# Patient Record
Sex: MALE | Race: WHITE | NOT HISPANIC OR LATINO | Employment: FULL TIME | URBAN - METROPOLITAN AREA
[De-identification: names, ages, dates, MRNs, and addresses within clinical notes are randomized per-mention and may not be internally consistent; named-entity substitution may affect disease eponyms.]

---

## 2023-12-21 ENCOUNTER — OFFICE VISIT (OUTPATIENT)
Dept: ENDOCRINOLOGY | Age: 43
End: 2023-12-21
Payer: COMMERCIAL

## 2023-12-21 ENCOUNTER — SPECIALTY PHARMACY (OUTPATIENT)
Dept: ENDOCRINOLOGY | Age: 43
End: 2023-12-21
Payer: COMMERCIAL

## 2023-12-21 VITALS
DIASTOLIC BLOOD PRESSURE: 80 MMHG | SYSTOLIC BLOOD PRESSURE: 132 MMHG | TEMPERATURE: 97.2 F | HEIGHT: 72 IN | WEIGHT: 260.4 LBS | OXYGEN SATURATION: 99 % | BODY MASS INDEX: 35.27 KG/M2 | HEART RATE: 71 BPM

## 2023-12-21 DIAGNOSIS — E11.9 TYPE 2 DIABETES MELLITUS WITHOUT COMPLICATION, WITHOUT LONG-TERM CURRENT USE OF INSULIN: Primary | ICD-10-CM

## 2023-12-21 DIAGNOSIS — E78.2 MIXED HYPERLIPIDEMIA: ICD-10-CM

## 2023-12-21 DIAGNOSIS — E55.9 VITAMIN D INSUFFICIENCY: ICD-10-CM

## 2023-12-21 DIAGNOSIS — I10 ESSENTIAL HYPERTENSION: ICD-10-CM

## 2023-12-21 RX ORDER — METFORMIN HYDROCHLORIDE 500 MG/1
1000 TABLET, EXTENDED RELEASE ORAL DAILY
Qty: 60 TABLET | Refills: 3 | Status: SHIPPED | OUTPATIENT
Start: 2023-12-21 | End: 2024-03-20

## 2023-12-21 RX ORDER — SEMAGLUTIDE 0.68 MG/ML
0.25 INJECTION, SOLUTION SUBCUTANEOUS WEEKLY
Qty: 3 ML | Refills: 3 | Status: SHIPPED | OUTPATIENT
Start: 2023-12-21

## 2023-12-21 RX ORDER — ALLOPURINOL 300 MG/1
TABLET ORAL
COMMUNITY
Start: 2022-04-01

## 2023-12-21 RX ORDER — ROSUVASTATIN CALCIUM 10 MG/1
10 TABLET, COATED ORAL NIGHTLY
Qty: 30 TABLET | Refills: 11 | Status: SHIPPED | OUTPATIENT
Start: 2023-12-21 | End: 2024-12-20

## 2023-12-21 RX ORDER — LISINOPRIL 2.5 MG/1
2.5 TABLET ORAL DAILY
Qty: 30 TABLET | Refills: 11 | Status: SHIPPED | OUTPATIENT
Start: 2023-12-21 | End: 2024-12-20

## 2023-12-21 NOTE — ASSESSMENT & PLAN NOTE
Given family history of premature coronary artery disease and type 2 diabetes will start rosuvastatin 10 mg daily  Emphasized on the importance of diet and daily exercise  Repeat fasting lipid profile today

## 2023-12-21 NOTE — PROGRESS NOTES
"Chief Complaint  Diabetes (Dm 2/ not testing bs )    Subjective        Siena Pop Jr. presents to [facility] to establish care        Diabetes  Pertinent negatives for hypoglycemia include no dizziness. Pertinent negatives for diabetes include no fatigue.       Initially was diagnosed with prediabetes     HgA1C: 6.6% in 2023 , diabetic range now  Takes metformin 1000 mg twice daily  Denies diabetic complications  checks his fasting blood sugar usually it is in 120-160      His son has type 2 diabetes and his father also has type 2 diabetes  His dad had a heart attack at young age  His mother  of CHF before age 60    Denies personal or family history of pancreatitis or pancreatic cancer  Denies personal or family history of thyroid cancer      TSH 2.256  Free T4 : 1.09  TPO negative     Has vitamin D deficiency and takes vitamin D supplements    Vitamin D 28.8       Fasting lipid profile in 2023       Does not take any medication        Used to take losartan which was switched to lisinopril 2.5 mg daily  Has been off lisinopril since 2023 (ran out of medication)         Objective   Vital Signs:  /80   Pulse 71   Temp 97.2 °F (36.2 °C) (Temporal)   Ht 182.9 cm (72\")   Wt 118 kg (260 lb 6.4 oz)   SpO2 99%   BMI 35.32 kg/m²   Estimated body mass index is 35.32 kg/m² as calculated from the following:    Height as of this encounter: 182.9 cm (72\").    Weight as of this encounter: 118 kg (260 lb 6.4 oz).             Review of Systems   Constitutional:  Negative for fatigue and unexpected weight change.   Eyes:  Negative for visual disturbance.   Gastrointestinal:  Negative for abdominal pain, nausea and vomiting.   Neurological:  Negative for dizziness and light-headedness.        Physical Exam  Constitutional:       Appearance: He is obese.   HENT:      Head: Normocephalic and atraumatic.   Eyes:      Extraocular Movements: Extraocular " movements intact.   Cardiovascular:      Rate and Rhythm: Normal rate and regular rhythm.   Pulmonary:      Effort: Pulmonary effort is normal.      Breath sounds: Normal breath sounds. No wheezing.   Abdominal:      Palpations: Abdomen is soft.      Tenderness: There is no abdominal tenderness.   Musculoskeletal:         General: No swelling. Normal range of motion.      Cervical back: Neck supple. No tenderness.   Neurological:      Mental Status: He is alert and oriented to person, place, and time.   Psychiatric:         Mood and Affect: Mood normal.        Result Review :      Data reviewed : Outside labs reviewed and is scanned             Assessment and Plan   Diagnoses and all orders for this visit:    1. Type 2 diabetes mellitus without complication, without long-term current use of insulin (Primary)  Assessment & Plan:  Diabetes is unchanged.   Reminded to bring in blood sugar diary at next visit.  Dietary recommendations for ADA diet.  Regular aerobic exercise.  Discussed ways to avoid symptomatic hypoglycemia.  Discussed foot care.  Reminded to get yearly retinal exam.  Medication changes per orders.  Prescription for metformin extended release 500 mg 2 tablets daily was sent in  Will start Ozempic  GLP-1 agonists are given by shots under the skin. They are not insulin.   They make your pancreas produce more insulin when needed in response to food and high blood sugar levels, if the pancreas can make more insulin.     They come in pens to be injected under the skin (subcutaneously). One of them is a daily pill (Rebylsus) that has to be taken in the morning on an empty stomach 30 minutes before eating.     These medications include: Trulicity once weekly; Victoza once daily, Byetta twice daily, Bydureon once weekly, or Ozempic once weekly.     They also suppress your appetite and make a person goss faster.     Benefits could include: 1. Weight reduction; 2. Better blood sugar levels; 3. Victoza, Ozempic  and Trulicity have added benefits for cardiovascular disease.     Main side effects could include: nausea and vomiting.   Possible associations with these medications that haven't definitively been found to be caused by these medications: medullary thyroid cancer, pancreatitis, and pancreatic cancer.   Ozempic is started at 0.25 mg weekly for 4 weeks, then increased to 0.5 mg weekly. Dose can be further increased to if needed.  Check hemoglobin A1c, C-peptide, microalbumin, and alonzo 65    Diabetes will be reassessed in 3 months.    Orders:  -     Hemoglobin A1c  -     C-Peptide  -     Comprehensive Metabolic Panel  -     Microalbumin / Creatinine Urine Ratio - Urine, Clean Catch  -     Vitamin D,25-Hydroxy  -     Glutamic Acid Decarboxylase  -     metFORMIN ER (GLUCOPHAGE-XR) 500 MG 24 hr tablet; Take 2 tablets by mouth Daily for 90 days.  Dispense: 60 tablet; Refill: 3  -     Semaglutide,0.25 or 0.5MG/DOS, (Ozempic, 0.25 or 0.5 MG/DOSE,) 2 MG/3ML solution pen-injector; Inject 0.25 mg under the skin into the appropriate area as directed 1 (One) Time Per Week for 4 weeks. Then increase the dose to 0.5 mg weekly.  Dispense: 3 mL; Refill: 3  -     Ambulatory Referral to Diabetic Education  -     rosuvastatin (Crestor) 10 MG tablet; Take 1 tablet by mouth Every Night.  Dispense: 30 tablet; Refill: 11    2. Mixed hyperlipidemia  Assessment & Plan:  Given family history of premature coronary artery disease and type 2 diabetes will start rosuvastatin 10 mg daily  Emphasized on the importance of diet and daily exercise  Repeat fasting lipid profile today    Orders:  -     Lipid Panel  -     rosuvastatin (Crestor) 10 MG tablet; Take 1 tablet by mouth Every Night.  Dispense: 30 tablet; Refill: 11    3. Vitamin D insufficiency  Assessment & Plan:  Takes vitamin D we will check vitamin D level and adjust the dose of vitamin D accordingly    Orders:  -     Vitamin D,25-Hydroxy    4. Essential hypertension  Assessment &  Plan:  Will resume the home dose of lisinopril 2.5 mg daily emphasized on the importance of diet and medication compliance      Orders:  -     lisinopril (Zestril) 2.5 MG tablet; Take 1 tablet by mouth Daily.  Dispense: 30 tablet; Refill: 11             Follow Up   Return in about 3 months (around 3/21/2024).  Patient was given instructions and counseling regarding his condition or for health maintenance advice. Please see specific information pulled into the AVS if appropriate.

## 2023-12-21 NOTE — PROGRESS NOTES
Specialty Pharmacy Patient Management Program  Endocrinology Initial Assessment     Siena Pop Jr. was referred by an Endocrinology provider to the Endocrinology Patient Management program offered by [facility] for Type 2 Diabetes on 12/21/23.  An initial outreach was conducted, including assessment of therapy appropriateness and specialty medication education for Ozempic. The patient was introduced to services offered by [facility], including: regular assessments, refill coordination, curbside pick-up or mail order delivery options, prior authorization maintenance, and financial assistance programs as applicable. The patient was also provided with contact information for the pharmacy team.     Insurance Coverage & Financial Support  Benefits Investigation Summary    Prescription: New Therapy    Dispensing pharmacy: [facility]    Copay amount: $25/30days *with co-pay card*    PLAN: [employer]  BIN: [id number]  PCN: [facility]  RX GROUP: [id number]    Prior Auth and Med Assistance notes: PA approved 12/21/23 through 12/20/24  CMM Key: [id number]    Relevant Past Medical History and Comorbidities  Relevant medical history and concomitant health conditions were discussed with the patient. The patient's chart has been reviewed for relevant past medical history and comorbid conditions and updated as necessary.  No past medical history on file.  Social History     Socioeconomic History    Marital status:    Tobacco Use    Smoking status: Never    Smokeless tobacco: Never   Substance and Sexual Activity    Alcohol use: Yes     Comment: 1-2 drinks week       Problem list reviewed by Lulu Madison, PharmD on 12/21/2023 at  1:47 PM    Allergies  Known allergies and reactions were discussed with the patient. The patient's chart has been reviewed for  allergy information and updated as necessary.   Allergies   Allergen Reactions    Losartan Other  "(See Comments)     Severe back pain        Allergies reviewed by Lulu Madison PharmD on 12/21/2023 at  1:47 PM    Relevant Laboratory Values  Relevant laboratory values were discussed with the patient. The following specialty medication dose adjustment(s) are recommended: No dosage adjustments recommended pending new lab results.     No results found for: \"HGBA1C\"  No results found for: \"GLUCOSE\", \"CALCIUM\", \"NA\", \"K\", \"CO2\", \"CL\", \"BUN\", \"CREATININE\", \"EGFRIFAFRI\", \"EGFRIFNONA\", \"BCR\", \"ANIONGAP\"  No results found for: \"CHOL\", \"CHLPL\", \"TRIG\", \"HDL\", \"LDL\", \"LDLDIRECT\"      Current Medication List  This medication list has been reviewed with the patient and evaluated for any interactions or necessary modifications/recommendations, and updated to include all prescription medications, OTC medications, and supplements the patient is currently taking.  This list reflects what is contained in the patient's profile, which has also been marked as reviewed to communicate to other providers it is the most up to date version of the patient's current medication therapy.     Current Outpatient Medications:     allopurinol (ZYLOPRIM) 300 MG tablet, , Disp: , Rfl:     Cholecalciferol (VITAMIN D-3 PO), Take  by mouth., Disp: , Rfl:     lisinopril (Zestril) 2.5 MG tablet, Take 1 tablet by mouth Daily., Disp: 30 tablet, Rfl: 11    metFORMIN ER (GLUCOPHAGE-XR) 500 MG 24 hr tablet, Take 2 tablets by mouth Daily for 90 days., Disp: 60 tablet, Rfl: 3    Multiple Vitamins-Minerals (Multi For Him) capsule, Take  by mouth., Disp: , Rfl:     rosuvastatin (Crestor) 10 MG tablet, Take 1 tablet by mouth Every Night., Disp: 30 tablet, Rfl: 11    Semaglutide,0.25 or 0.5MG/DOS, (Ozempic, 0.25 or 0.5 MG/DOSE,) 2 MG/3ML solution pen-injector, Inject 0.25 mg under the skin into the appropriate area as directed 1 (One) Time Per Week for 4 weeks. Then increase the dose to 0.5 mg weekly., Disp: 3 mL, Rfl: 3         Drug Interactions  No drug " interactions identified  Recommended Medications Assessment  Aspirin: Not Taking Currently  Statin: Currently Taking   ACEi/ARB: Currently Taking     Initial Education Provided for Specialty Medication  The patient has been provided with the following education and any applicable administration techniques (i.e. self-injection) have been demonstrated for the therapies indicated. All questions and concerns have been addressed prior to the patient receiving the medication, and the patient has verbalized comprehension of the education and any materials provided. Additional patient education shall be provided and documented upon request by the patient, provider, or payer.    Ozempic® (semaglutide)  Medication Expectations   Why am I taking this medication? You are taking Ozempic, along with diet and exercise, to lower blood sugar because you have type 2 diabetes. Diabetes is not curable but with proper medication and treatment, we can keep your blood sugar within your personalized target range.    What should I expect while on this medication? You should expect to see your blood sugar, A1c and possibly weight decrease over time.   How does the medication work? Ozempic is a non-insulin injection that works with your body to release insulin in response to your blood sugar rising.  This medication also slows down food from leaving your stomach, making you feel goss for longer.   How long will I be on this medication for? The amount of time you will be on this medication will be determined by your doctor based on blood sugar and A1c control. You will most likely be on this medication or another diabetes medication throughout your lifetime. Do not abruptly stop this medication without talking to your doctor first.    How do I take this medication? Take as directed on your prescription label. Ozempic is injected under the skin (subcutaneously) of your stomach, thigh or upper arm.  Use this medication once weekly, on the same  day each week, and it can be given with or without food.    What are some possible side effects? You may notice you don't feel as hungry, especially when you first start using Ozempic.  The most common side effects are nausea, stomach cramping, and constipation. Stop using Ozempic and call your doctor immediately if you have severe pain in your stomach area that will not go away.    What happens if I miss a dose? If you miss a dose, take it as soon as you remember as long as it is within 5 days after your missed dose.  If more than 5 days have passed, skip the missed dose and resume Ozempic on the regularly scheduled day.     Medication Safety   What are things I should warn my doctor immediately about? Do not use Ozempic if you or a family member have ever had medullary thyroid cancer (MTC) or Multiple Endocrine Neoplasia syndrome type 2 (MEN 2).  Tell your doctor if you have or have had problems with your kidneys or pancreas, or if you are pregnant, planning to become pregnant. Stop using Ozempic and get medical help right away if you have severe pain that will not go away, or if you notice any signs/symptoms of an allergic reaction (rash, hives, difficulty breathing, etc.).    What are things that I should be cautious of? Be cautious of any side effects from this medication. Talk to your doctor if any new ones develop or aren't getting better.    What are some medications that can interact with this one? Taking Ozempic with other medications that also lower your blood sugar such as insulin and glipizide/glimepiride/glyburide may increase the risk of low blood sugar. Your doctor may reduce the dose of these medications when you start Ozempic.  Always tell your doctor or pharmacist immediately if you start taking any new medications, including over-the-counter medications, vitamins, and herbal supplements.       Medication Storage/Handling   How should I handle this medication? Keep this medication out of reach of  pets/children and keep the pen capped    How does this medication need to be stored? Store in the refrigerator prior to first use, but do not freeze.  After first use, you may continue to store in the refrigerator or at room temperature for 56 days.  Protect from excessive heat and sunlight.   How should I dispose of this medication? Used Ozempic pens should be thrown away after 56 days, even if medication remains. If your doctor decides to stop this medication, take to your local police station for proper disposal. Some pharmacies also have take-back bins for medication drop-off.      Resources/Support   How can I remind myself to take this medication? You can download reminder apps to help you manage your refills. You may also set an alarm on your phone to remind you.    Is financial support available?  [employer] can provide co-pay cards if you have commercial insurance or patient assistance if you have Medicare or no insurance.    Which vaccines are recommended for me? Talk to your doctor about these vaccines: Flu, Coronavirus (COVID-19), Pneumococcal (pneumonia), Tdap, Hepatitis B, Zoster (shingles)      Adherence and Self-Administration  Adherence related to the patient's specialty therapy was discussed with the patient. The Adherence segment of this outreach has been reviewed and updated.     Is there a concern with patient's ability to self administer the medication correctly and without issue?: No  Were any potential barriers to adherence identified during the initial assessment or patient education?: No  Are there any concerns regarding the patient's understanding of the importance of medication adherence?: No  Methods for Supporting Patient Adherence and/or Self-Administration: N/A    Open Medication Therapy Problems  No medication therapy recommendations to display    Goals of Therapy  Goals related to the patient's specialty therapy were discussed with the patient. The Patient Goals segment of this  "outreach has been reviewed and updated.   Goals Addressed Today        Specialty Pharmacy General Goal      A1c < 7%    No results found for: \"HGBA1C\"              Reassessment Plan & Follow-Up  1. Medication Therapy Changes: Initiate Ozempic 0.25mg once weekly for 4 weeks followed by 0.5mg once weekly. Switch to metformin XR 1000mg daily. Resuming lisinopril 2.5mg once daily previously prescribed and initiating rosuvastatin 10mg daily.   2. Related Plans, Therapy Recommendations, or Therapy Problems to Be Addressed: None  3. Pharmacist to perform regular assessments no more than (6) months from the previous assessment.  4. Care Coordinator to set up future refill outreaches, coordinate prescription delivery, and escalate clinical questions to pharmacist.  5. Welcome information and patient satisfaction survey to be sent by specialty pharmacy team with patient's initial fill.    Attestation  Therapeutic appropriateness: Appropriate   I attest the patient was actively involved in and has agreed to the above plan of care. If the prescribed therapy is at any point deemed not appropriate based on the current or future assessments, a consultation will be initiated with the patient's specialty care provider to determine the best course of action. The revised plan of therapy will be documented along with any required assessments and/or additional patient education provided.     Lulu Madison, PharmD, BCPS, BCCCP  Clinical Specialty Pharmacist, Endocrinology  12/21/2023  13:51 EST  "

## 2023-12-21 NOTE — ASSESSMENT & PLAN NOTE
Will resume the home dose of lisinopril 2.5 mg daily emphasized on the importance of diet and medication compliance

## 2023-12-21 NOTE — ASSESSMENT & PLAN NOTE
Diabetes is unchanged.   Reminded to bring in blood sugar diary at next visit.  Dietary recommendations for ADA diet.  Regular aerobic exercise.  Discussed ways to avoid symptomatic hypoglycemia.  Discussed foot care.  Reminded to get yearly retinal exam.  Medication changes per orders.  Prescription for metformin extended release 500 mg 2 tablets daily was sent in  Will start Ozempic  GLP-1 agonists are given by shots under the skin. They are not insulin.   They make your pancreas produce more insulin when needed in response to food and high blood sugar levels, if the pancreas can make more insulin.     They come in pens to be injected under the skin (subcutaneously). One of them is a daily pill (Rebylsus) that has to be taken in the morning on an empty stomach 30 minutes before eating.     These medications include: Trulicity once weekly; Victoza once daily, Byetta twice daily, Bydureon once weekly, or Ozempic once weekly.     They also suppress your appetite and make a person goss faster.     Benefits could include: 1. Weight reduction; 2. Better blood sugar levels; 3. Victoza, Ozempic and Trulicity have added benefits for cardiovascular disease.     Main side effects could include: nausea and vomiting.   Possible associations with these medications that haven't definitively been found to be caused by these medications: medullary thyroid cancer, pancreatitis, and pancreatic cancer.   Ozempic is started at 0.25 mg weekly for 4 weeks, then increased to 0.5 mg weekly. Dose can be further increased to if needed.  Check hemoglobin A1c, C-peptide, microalbumin, and alonzo 65    Diabetes will be reassessed in 3 months.

## 2023-12-22 ENCOUNTER — PATIENT ROUNDING (BHMG ONLY) (OUTPATIENT)
Dept: ENDOCRINOLOGY | Age: 43
End: 2023-12-22
Payer: COMMERCIAL

## 2023-12-26 LAB
25(OH)D3+25(OH)D2 SERPL-MCNC: 22.8 NG/ML (ref 30–100)
ALBUMIN SERPL-MCNC: 4.5 G/DL (ref 4.1–5.1)
ALBUMIN/CREAT UR: <5 MG/G CREAT (ref 0–29)
ALBUMIN/GLOB SERPL: 1.7 {RATIO} (ref 1.2–2.2)
ALP SERPL-CCNC: 75 IU/L (ref 44–121)
ALT SERPL-CCNC: 48 IU/L (ref 0–44)
AST SERPL-CCNC: 30 IU/L (ref 0–40)
BILIRUB SERPL-MCNC: 0.5 MG/DL (ref 0–1.2)
BUN SERPL-MCNC: 18 MG/DL (ref 6–24)
BUN/CREAT SERPL: 17 (ref 9–20)
C PEPTIDE SERPL-MCNC: 3.1 NG/ML (ref 1.1–4.4)
CALCIUM SERPL-MCNC: 9.6 MG/DL (ref 8.7–10.2)
CHLORIDE SERPL-SCNC: 101 MMOL/L (ref 96–106)
CHOLEST SERPL-MCNC: 247 MG/DL (ref 100–199)
CO2 SERPL-SCNC: 23 MMOL/L (ref 20–29)
CREAT SERPL-MCNC: 1.03 MG/DL (ref 0.76–1.27)
CREAT UR-MCNC: 54.7 MG/DL
EGFRCR SERPLBLD CKD-EPI 2021: 92 ML/MIN/1.73
GAD65 AB SER IA-ACNC: <5 U/ML (ref 0–5)
GLOBULIN SER CALC-MCNC: 2.7 G/DL (ref 1.5–4.5)
GLUCOSE SERPL-MCNC: 151 MG/DL (ref 70–99)
HBA1C MFR BLD: 7.1 % (ref 4.8–5.6)
HDLC SERPL-MCNC: 32 MG/DL
IMP & REVIEW OF LAB RESULTS: NORMAL
LDLC SERPL CALC-MCNC: 135 MG/DL (ref 0–99)
MICROALBUMIN UR-MCNC: <3 UG/ML
POTASSIUM SERPL-SCNC: 4.4 MMOL/L (ref 3.5–5.2)
PROT SERPL-MCNC: 7.2 G/DL (ref 6–8.5)
SODIUM SERPL-SCNC: 138 MMOL/L (ref 134–144)
TRIGL SERPL-MCNC: 439 MG/DL (ref 0–149)
VLDLC SERPL CALC-MCNC: 80 MG/DL (ref 5–40)

## 2024-01-18 ENCOUNTER — SPECIALTY PHARMACY (OUTPATIENT)
Dept: ENDOCRINOLOGY | Age: 44
End: 2024-01-18
Payer: COMMERCIAL

## 2024-01-18 NOTE — PROGRESS NOTES
Specialty Pharmacy Refill Coordination Note     Siena is a 43 y.o. male contacted today regarding refills of  1 specialty medication(s).    Reviewed and verified with patient:       Specialty medication(s) and dose(s) confirmed: yes    Refill Questions      Flowsheet Row Most Recent Value   Changes to allergies? No   Changes to medications? No   New conditions or infections since last clinic visit No   Unplanned office visit, urgent care, ED, or hospital admission in the last 4 weeks  No   How does patient/caregiver feel medication is working? Good   Financial problems or insurance changes  No   Since the previous refill, were any specialty medication doses or scheduled injections missed or delayed?  No   Does this patient require a clinical escalation to a pharmacist? No            Delivery Questions      Flowsheet Row Most Recent Value   Delivery method  at Pharmacy   Delivery address verified with patient/caregiver? Yes   Delivery address Home   Number of medications in delivery 3   Medication(s) being filled and delivered Metformin Hcl (Biguanides), Lisinopril (Ace Inhibitors), Rosuvastatin Calcium   Doses left of specialty medications 1 week   Copay verified? Yes   Copay amount $19.07   Copay form of payment Pay at pickup                   Follow-up: 25 day(s)     Maria L Dan, Pharmacy Technician  Specialty Pharmacy Technician

## 2024-01-26 ENCOUNTER — SPECIALTY PHARMACY (OUTPATIENT)
Dept: ENDOCRINOLOGY | Age: 44
End: 2024-01-26
Payer: COMMERCIAL

## 2024-01-26 NOTE — PROGRESS NOTES
Specialty Pharmacy Refill Coordination Note     Siena is a 43 y.o. male contacted today regarding refills of  1 specialty medication(s).    Reviewed and verified with patient:       Specialty medication(s) and dose(s) confirmed: yes    Refill Questions      Flowsheet Row Most Recent Value   Changes to allergies? No   Changes to medications? No   New conditions or infections since last clinic visit No   Unplanned office visit, urgent care, ED, or hospital admission in the last 4 weeks  No   How does patient/caregiver feel medication is working? Good   Financial problems or insurance changes  No   Since the previous refill, were any specialty medication doses or scheduled injections missed or delayed?  No   Does this patient require a clinical escalation to a pharmacist? No            Delivery Questions      Flowsheet Row Most Recent Value   Delivery method  at Pharmacy   Delivery address verified with patient/caregiver? Yes   Delivery address Home   Number of medications in delivery 1   Medication(s) being filled and delivered Semaglutide   Doses left of specialty medications 1 week   Copay verified? Yes   Copay amount $25   Copay form of payment Pay at pickup                   Follow-up: 25 day(s)     Maria L Dan, Pharmacy Technician  Specialty Pharmacy Technician

## 2024-02-05 ENCOUNTER — SPECIALTY PHARMACY (OUTPATIENT)
Dept: ENDOCRINOLOGY | Age: 44
End: 2024-02-05
Payer: COMMERCIAL

## 2024-02-05 NOTE — PROGRESS NOTES
Specialty Pharmacy Patient Management Program  One-Time Clinical Outreach     Siena Pop Jr. is a 43 y.o. male seen by an Endocrinology provider for Type 2 Diabetes and enrolled in the Endocrinology Patient Management program offered by [facility].      Patient called and explained that he had experienced some nausea and headache after his first dose of ozempic 0.5 mg. Provided counseling to patient about how to minimize upset stomach symptoms including decreasing portion sizes of meals slightly, avoiding greasy/fatty foods, etc. Also explained to patient that when increasing the dose, it is common to experience some of these symptoms for the first 1-2 doses, then symptoms typically start to resolve. Patient is going to continue the medication and was advised to call if symptoms do not improve or worsen.  Patient expressed understanding and has no other questions at this time.    Clari Monique, PharmD  Clinical Specialty Pharmacist, Endocrinology  2/5/2024  15:12 EST

## 2024-02-12 ENCOUNTER — SPECIALTY PHARMACY (OUTPATIENT)
Dept: ENDOCRINOLOGY | Age: 44
End: 2024-02-12
Payer: COMMERCIAL

## 2024-02-29 ENCOUNTER — SPECIALTY PHARMACY (OUTPATIENT)
Dept: ENDOCRINOLOGY | Age: 44
End: 2024-02-29
Payer: COMMERCIAL

## 2024-02-29 NOTE — PROGRESS NOTES
Specialty Pharmacy Refill Coordination Note     Siena is a 43 y.o. male contacted today regarding refills of  2 specialty medication(s).    Reviewed and verified with patient:       Specialty medication(s) and dose(s) confirmed: yes    Refill Questions      Flowsheet Row Most Recent Value   Changes to allergies? No   Changes to medications? No   New conditions or infections since last clinic visit No   Unplanned office visit, urgent care, ED, or hospital admission in the last 4 weeks  No   How does patient/caregiver feel medication is working? Good   Financial problems or insurance changes  No   Since the previous refill, were any specialty medication doses or scheduled injections missed or delayed?  No   Does this patient require a clinical escalation to a pharmacist? No            Delivery Questions      Flowsheet Row Most Recent Value   Delivery method  at Pharmacy   Delivery address verified with patient/caregiver? Yes   Delivery address Home   Number of medications in delivery 2   Medication(s) being filled and delivered Allopurinol (Gout Agents), Semaglutide   Doses left of specialty medications 1 week   Copay verified? Yes   Copay amount 53.68   Copay form of payment Pay at pickup                   Follow-up: 25 day(s)     Maria L Dan, Pharmacy Technician  Specialty Pharmacy Technician

## 2024-03-18 ENCOUNTER — SPECIALTY PHARMACY (OUTPATIENT)
Dept: ENDOCRINOLOGY | Age: 44
End: 2024-03-18
Payer: COMMERCIAL

## 2024-03-18 NOTE — PROGRESS NOTES
Specialty Pharmacy Refill Coordination Note     Siena is a 44 y.o. male contacted today regarding refills of  3 specialty medication(s).    Reviewed and verified with patient:       Specialty medication(s) and dose(s) confirmed: yes    Refill Questions      Flowsheet Row Most Recent Value   Changes to allergies? No   Changes to medications? No   New conditions or infections since last clinic visit No   Unplanned office visit, urgent care, ED, or hospital admission in the last 4 weeks  No   How does patient/caregiver feel medication is working? Good   Financial problems or insurance changes  No   Since the previous refill, were any specialty medication doses or scheduled injections missed or delayed?  No   Does this patient require a clinical escalation to a pharmacist? No            Delivery Questions      Flowsheet Row Most Recent Value   Delivery method  at Pharmacy   Delivery address verified with patient/caregiver? Yes   Delivery address Home   Number of medications in delivery 3   Medication(s) being filled and delivered Metformin Hcl (Biguanides), Lisinopril (Ace Inhibitors), Rosuvastatin Calcium   Doses left of specialty medications 1 week   Copay verified? Yes   Copay amount $18.30   Copay form of payment Pay at pickup                   Follow-up: 25 day(s)     Maria L Dan, Pharmacy Technician  Specialty Pharmacy Technician

## 2024-03-21 ENCOUNTER — OFFICE VISIT (OUTPATIENT)
Dept: ENDOCRINOLOGY | Age: 44
End: 2024-03-21
Payer: COMMERCIAL

## 2024-03-21 VITALS
HEART RATE: 84 BPM | DIASTOLIC BLOOD PRESSURE: 82 MMHG | SYSTOLIC BLOOD PRESSURE: 126 MMHG | TEMPERATURE: 97.7 F | HEIGHT: 72 IN | BODY MASS INDEX: 32.59 KG/M2 | OXYGEN SATURATION: 98 % | WEIGHT: 240.6 LBS

## 2024-03-21 DIAGNOSIS — E78.2 MIXED HYPERLIPIDEMIA: ICD-10-CM

## 2024-03-21 DIAGNOSIS — I10 ESSENTIAL HYPERTENSION: ICD-10-CM

## 2024-03-21 DIAGNOSIS — E55.9 VITAMIN D INSUFFICIENCY: ICD-10-CM

## 2024-03-21 DIAGNOSIS — E11.65 TYPE 2 DIABETES MELLITUS WITH HYPERGLYCEMIA, WITHOUT LONG-TERM CURRENT USE OF INSULIN: Primary | ICD-10-CM

## 2024-03-21 NOTE — ASSESSMENT & PLAN NOTE
Plan:  Continue same medication/s without change.      Discussed medication dosage, use, side effects, and goals of treatment in detail.    Counseled patient on lifestyle modifications to help control hyperlipidemia.     Patient Treatment Goals:   LDL goal is less than 70  Repeat fasting lipid  Followup in 6 months.

## 2024-03-21 NOTE — PROGRESS NOTES
"Chief Complaint  Diabetes, Hyperlipidemia, Vitamin D Deficiency, and Hypertension    Subjective        Siena Pop Jr. presents to [facility]  for follow up.       History of Present Illness    Initially was diagnosed with prediabetes in     CARYN 65 negative   C-peptide 3.1     HgA1C: 7.1% in 2023    Takes metformin 500 mg two tablets daily  Takes Ozempic 0.5 mg weekly, on injection day feels nauseous   Has not feeling well after he had a fatty meal since last week   Denies diabetic complications     MACR < 5     His son has type 2 diabetes and his father also has type 2 diabetes  His dad had a heart attack at young age  His mother  of CHF before age 60     Denies personal or family history of pancreatitis or pancreatic cancer  Denies personal or family history of thyroid cancer        Has vitamin D deficiency      Vitamin D 22.8    Takes vitamin D 2000 units daily         2023  Triglyceride 439    Takes Crestor 10 mg at nightly     Takes lisinopril for blood pressure            Objective   Vital Signs:  /82   Pulse 84   Temp 97.7 °F (36.5 °C) (Temporal)   Ht 182.9 cm (72.01\")   Wt 109 kg (240 lb 9.6 oz)   SpO2 98%   BMI 32.62 kg/m²   Estimated body mass index is 32.62 kg/m² as calculated from the following:    Height as of this encounter: 182.9 cm (72.01\").    Weight as of this encounter: 109 kg (240 lb 9.6 oz).               Review of Systems   Constitutional:  Negative for unexpected weight change.   Eyes:  Negative for visual disturbance.   Gastrointestinal:  Positive for nausea.   Neurological:  Negative for dizziness and light-headedness.        Physical Exam  Constitutional:       Appearance: He is obese.   HENT:      Head: Normocephalic and atraumatic.   Eyes:      Extraocular Movements: Extraocular movements intact.   Cardiovascular:      Rate and Rhythm: Normal rate and regular rhythm.   Pulmonary:      Effort: Pulmonary effort is " normal.      Breath sounds: Normal breath sounds. No wheezing.   Abdominal:      Palpations: Abdomen is soft.      Tenderness: There is no abdominal tenderness.   Musculoskeletal:         General: No swelling. Normal range of motion.      Cervical back: Neck supple. No tenderness.   Neurological:      General: No focal deficit present.      Mental Status: He is alert and oriented to person, place, and time.   Psychiatric:         Mood and Affect: Mood normal.        Result Review :  The following data was reviewed by: Mahrokh Nokhbehzaeim, MD on 03/21/2024:  CMP          12/21/2023    10:58   CMP   Glucose 151    BUN 18    Creatinine 1.03    Sodium 138    Potassium 4.4    Chloride 101    Calcium 9.6    Total Protein 7.2    Albumin 4.5    Globulin 2.7    Total Bilirubin 0.5    Alkaline Phosphatase 75    AST (SGOT) 30    ALT (SGPT) 48    BUN/Creatinine Ratio 17          Lipid Panel          12/21/2023    10:58   Lipid Panel   Total Cholesterol 247    Triglycerides 439    HDL Cholesterol 32    VLDL Cholesterol 80    LDL Cholesterol  135        Electrolytes          12/21/2023    10:58   Electrolytes   Sodium 138    Potassium 4.4    Chloride 101    Calcium 9.6      Most Recent A1C          12/21/2023    10:58   HGBA1C Most Recent   Hemoglobin A1C 7.1        A1C Last 3 Results          12/21/2023    10:58   HGBA1C Last 3 Results   Hemoglobin A1C 7.1      Microalbumin          12/21/2023    10:58   Microalbumin   Microalbumin, Urine <3.0                   Assessment and Plan   Diagnoses and all orders for this visit:    1. Type 2 diabetes mellitus with hyperglycemia, without long-term current use of insulin (Primary)  Assessment & Plan:  Diabetes is improving with treatment.   Continue current treatment regimen.  Reminded to bring in blood sugar diary at next visit.  Recommended an ADA diet.  Recommended a Mediterranean style of eating  Regular aerobic exercise.  Discussed ways to avoid symptomatic  hypoglycemia.  Discussed sick day management.  Discussed foot care.  Reminded to get yearly retinal exam.  Continue the current dose of metformin  Due to his recent GI symptoms we will continue the current dose of Ozempic  Hemoglobin A1c, CMP and microalbumin creatinine ratio  Diabetes will be reassessed in 6 months    Orders:  -     Vitamin D,25-Hydroxy  -     Microalbumin / Creatinine Urine Ratio - Urine, Clean Catch  -     Hemoglobin A1c  -     Comprehensive Metabolic Panel    2. Vitamin D insufficiency  Assessment & Plan:  Takes vitamin D 2000 units daily repeat vitamin D level today    Orders:  -     Vitamin D,25-Hydroxy    3. Mixed hyperlipidemia  Assessment & Plan:      Plan:  Continue same medication/s without change.      Discussed medication dosage, use, side effects, and goals of treatment in detail.    Counseled patient on lifestyle modifications to help control hyperlipidemia.     Patient Treatment Goals:   LDL goal is less than 70  Repeat fasting lipid  Followup in 6 months.    Orders:  -     Lipid Panel    4. Essential hypertension  Assessment & Plan:  Hypertension is stable and controlled  Continue current treatment regimen.  Dietary sodium restriction.  Weight loss.  Regular aerobic exercise.  Blood pressure will be reassessed in 6 months.               Follow Up   Return in about 6 months (around 9/21/2024).  Patient was given instructions and counseling regarding his condition or for health maintenance advice. Please see specific information pulled into the AVS if appropriate.

## 2024-03-21 NOTE — ASSESSMENT & PLAN NOTE
Diabetes is improving with treatment.   Continue current treatment regimen.  Reminded to bring in blood sugar diary at next visit.  Recommended an ADA diet.  Recommended a Mediterranean style of eating  Regular aerobic exercise.  Discussed ways to avoid symptomatic hypoglycemia.  Discussed sick day management.  Discussed foot care.  Reminded to get yearly retinal exam.  Continue the current dose of metformin  Due to his recent GI symptoms we will continue the current dose of Ozempic  Hemoglobin A1c, CMP and microalbumin creatinine ratio  Diabetes will be reassessed in 6 months

## 2024-03-26 NOTE — TELEPHONE ENCOUNTER
Specialty Pharmacy Patient Management Program  Prescription Refill Request     Siena Pop Jr. is a 44 y.o. male seen by an Endocrinology provider for Type 2 Diabetes and enrolled in the Endocrinology Patient Management program offered by [facility].      Patient called and reported that he has continued to experience significant nausea with the ozempic 0.5 mg weekly despite making adjustments to his diet. Discussed with provider and will switch to trulicity 0.75 mg weekly for 4 weeks, then increase to 1.5 mg weekly if able to tolerate.       Requested Prescriptions     Pending Prescriptions Disp Refills    Dulaglutide (Trulicity) 0.75 MG/0.5ML solution pen-injector 2 mL 0     Sig: Inject 0.75 mg under the skin into the appropriate area as directed 1 (One) Time Per Week. For 4 weeks, then increase to 1.5 mg weekly if able to tolerate    Dulaglutide (Trulicity) 1.5 MG/0.5ML solution pen-injector 2 mL 3     Sig: Inject 1.5 mg under the skin into the appropriate area as directed 1 (One) Time Per Week. If able to tolerate 0.75 mg weekly for 4 weeks     Last visit: 3/21/24  Next visit: 9/26/24    Pended for Dr. Nokhbehzaeim to review, and approve if appropriate.     Clari Monique, PharmD  Clinical Specialty Pharmacist, Endocrinology  3/26/2024  15:41 EDT        Complex Repair And Double Advancement Flap Text: The defect edges were debeveled with a #15 scalpel blade.  The primary defect was closed partially with a complex linear closure.  Given the location of the remaining defect, shape of the defect and the proximity to free margins a double advancement flap was deemed most appropriate for complete closure of the defect.  Using a sterile surgical marker, an appropriate advancement flap was drawn incorporating the defect and placing the expected incisions within the relaxed skin tension lines where possible.    The area thus outlined was incised deep to adipose tissue with a #15 scalpel blade.  The skin margins were undermined to an appropriate distance in all directions utilizing iris scissors.

## 2024-03-27 RX ORDER — DULAGLUTIDE 0.75 MG/.5ML
0.75 INJECTION, SOLUTION SUBCUTANEOUS WEEKLY
Qty: 2 ML | Refills: 0 | Status: SHIPPED | OUTPATIENT
Start: 2024-03-27

## 2024-03-27 RX ORDER — DULAGLUTIDE 1.5 MG/.5ML
1.5 INJECTION, SOLUTION SUBCUTANEOUS WEEKLY
Qty: 2 ML | Refills: 3 | Status: SHIPPED | OUTPATIENT
Start: 2024-03-27

## 2024-03-28 ENCOUNTER — SPECIALTY PHARMACY (OUTPATIENT)
Dept: ENDOCRINOLOGY | Age: 44
End: 2024-03-28
Payer: COMMERCIAL

## 2024-03-28 NOTE — PROGRESS NOTES
Specialty Pharmacy Patient Management Program  Endocrinology Medication Addition Assessment     Siena Pop Jr. was referred by an Endocrinology provider to the Endocrinology Patient Management Program offered by [facility] for Type 2 Diabetes. This assessment was conducted as a result of a specialty medication addition or substitution. The patient was previously introduced to services offered by [facility], including: regular assessments, refill coordination, curbside pick-up or mail order delivery options, prior authorization maintenance, and financial assistance programs as applicable. The patient was also provided with contact information for the pharmacy team.      An initial outreach was conducted, including assessment of therapy appropriateness and specialty medication education for Trulicity, which is replacing ozempic.      Insurance Coverage & Financial Support  No changes  Trulicity savings card- $25 monthly copay     Relevant Past Medical History and Comorbidities  Relevant medical history and concomitant health conditions were discussed with the patient. The patient's chart has been reviewed for relevant past medical history and comorbid conditions and updated as necessary.  No past medical history on file.  Social History     Socioeconomic History    Marital status:    Tobacco Use    Smoking status: Never    Smokeless tobacco: Never   Substance and Sexual Activity    Alcohol use: Yes     Comment: 1-2 drinks week       Problem list reviewed by Clari Monique, PharmD on 3/28/2024 at  2:21 PM    Hospitalizations and Urgent Care Since Last Assessment  ED Visits, Admissions, or Hospitalizations: none  Urgent Office Visits: none    Allergies  Known allergies and reactions were discussed with the patient. The patient's chart has been reviewed for  allergy information and updated as necessary.   Allergies   Allergen Reactions    Losartan Other (See  Comments)     Severe back pain        Allergies reviewed by Clari Monique, PharmD on 3/28/2024 at  2:21 PM    Relevant Laboratory Values  Relevant laboratory values were discussed with the patient. The following specialty medication dose adjustment(s) are recommended: n/a  A1C Last 3 Results          12/21/2023    10:58   HGBA1C Last 3 Results   Hemoglobin A1C 7.1      Lab Results   Component Value Date    HGBA1C 7.1 (H) 12/21/2023     Lab Results   Component Value Date    GLUCOSE 151 (H) 12/21/2023    CALCIUM 9.6 12/21/2023     12/21/2023    K 4.4 12/21/2023    CO2 23 12/21/2023     12/21/2023    BUN 18 12/21/2023    CREATININE 1.03 12/21/2023    BCR 17 12/21/2023     Lab Results   Component Value Date    CHLPL 247 (H) 12/21/2023    TRIG 439 (H) 12/21/2023    HDL 32 (L) 12/21/2023     (H) 12/21/2023     Microalbumin          12/21/2023    10:58   Microalbumin   Microalbumin, Urine <3.0        Current Medication List  This medication list has been reviewed with the patient and evaluated for any interactions or necessary modifications/recommendations, and updated to include all prescription medications, OTC medications, and supplements the patient is currently taking.  This list reflects what is contained in the patient's profile, which has also been marked as reviewed to communicate to other providers it is the most up to date version of the patient's current medication therapy.     Current Outpatient Medications:     allopurinol (ZYLOPRIM) 300 MG tablet, Take 1 tablet by mouth Daily., Disp: 90 tablet, Rfl: 0    Cholecalciferol (VITAMIN D-3 PO), Take  by mouth., Disp: , Rfl:     Dulaglutide (Trulicity) 0.75 MG/0.5ML solution pen-injector, Inject 0.75 mg under the skin into the appropriate area as directed 1 (One) Time Per Week. For 4 weeks, then increase to 1.5 mg weekly if able to tolerate, Disp: 2 mL, Rfl: 0    Dulaglutide (Trulicity) 1.5 MG/0.5ML solution pen-injector, Inject 1.5 mg under the  skin into the appropriate area as directed 1 (One) Time Per Week. If able to tolerate 0.75 mg weekly for 4 weeks, Disp: 2 mL, Rfl: 3    lisinopril (Zestril) 2.5 MG tablet, Take 1 tablet by mouth Daily., Disp: 30 tablet, Rfl: 11    metFORMIN (GLUCOPHAGE) 1000 MG tablet, Take 1 tablet by mouth 2 (Two) Times a Day With Meals., Disp: 180 tablet, Rfl: 0    metFORMIN ER (GLUCOPHAGE-XR) 500 MG 24 hr tablet, Take 2 tablets by mouth Daily for 90 days., Disp: 60 tablet, Rfl: 3    Multiple Vitamins-Minerals (Multi For Him) capsule, Take  by mouth., Disp: , Rfl:     rosuvastatin (Crestor) 10 MG tablet, Take 1 tablet by mouth Every Night., Disp: 30 tablet, Rfl: 11    Medicines reviewed by Clari Monique, PharmD on 3/28/2024 at  2:21 PM    Drug Interactions  none    Recommended Medications Assessment  Aspirin: Not Taking Currently  Statin: Currently Taking   ACEi/ARB: Currently Taking     Initial Education Provided for Specialty Medication  The patient has been provided with the following education and any applicable administration techniques (i.e. self-injection) have been demonstrated for the therapies indicated. All questions and concerns have been addressed prior to the patient receiving the medication, and the patient has verbalized comprehension of the education and any materials provided. Additional patient education shall be provided and documented upon request by the patient, provider, or payer.    TRULICITY® (dulaglutide)  Medication Expectations   Why am I taking this medication? You are taking Trulicity, along with diet and exercise, to lower blood sugar because you have type 2 diabetes. Diabetes is not curable but with proper medication and treatment, you can keep your blood sugar within your personalized target range. This medication may also reduce the risk of heart attack or stroke   What should I expect while on this medication? You should expect to see your blood sugar and A1c decrease over time and you may also  lose some weight.   How does the medication work? Trulicity is a non-insulin injection that works with your body's own ability to lower blood sugar and A1c and helps your body release its own insulin in response to your blood sugar rising.  This medication also slows down food from leaving your stomach, making you feel goss for longer.   How long will I be on this medication for? The amount of time you will be on this medication will be determined by your doctor based on blood sugar and A1c control. You will most likely be on this medication or another diabetes medication throughout your lifetime. Do not abruptly stop this medication without talking to your doctor first.    How do I take this medication? Take as directed on your prescription label. Trulicity is supplied in a single-use pen for each dose and you will use a new pre-filled pen each week.  It is injected under the skin (subcutaneously) of your stomach, thigh or upper arm. Use this medication once weekly, on the same day each week, with or without food.      What are some possible side effects? In addition to decreased appetite, the most common side effects are nausea and constipation.  Redness, itching, and/or swelling can occur where the shot was given. Hypoglycemia can occur, especially if you are taking Trulicity with other medications that cause low blood sugar.    What happens if I miss a dose? If you miss a dose, take it as soon as you remember as long as there are at least 3 days until the next scheduled dose.  If there are less than 3 days, skip the missed dose and resume Trulicity on the regularly scheduled day.  Do not take 2 doses at the same time or extra doses.     Medication Safety   What are things I should warn my doctor immediately about? Do not use Trulicity if you or a family member have ever had medullary thyroid cancer (MTC) or Multiple Endocrine Neoplasia syndrome type 2 (MEN 2).  Tell your doctor if you have or have had problems  with your kidneys or pancreas. Stop using Trulicity and get medical help right away if you have severe pain in your stomach area that will not go away. Talk to your doctor if you are pregnant, planning to become pregnant, or breastfeeding. Get medical help right away if you notice any signs/symptoms of an allergic reaction (rash, hives, difficulty breathing, etc.).   What are things that I should be cautious of? Be cautious of any side effects from this medication. Talk to your doctor if any new ones develop or aren't getting better.   What are some medications that can interact with this one? Taking Trulicity with other medications that also lower your blood sugar such as insulin and glipizide/glimepiride/glyburide may increase the risk of low blood sugar. Your doctor may reduce the dose of these medications when you start Trulicity. Always tell your doctor or pharmacist immediately if you start taking any new medications, including over-the-counter medications, vitamins, and herbal supplements.      Medication Storage/Handling   How should I handle this medication? Keep this medication out of reach of pets/children and keep the pen capped   How does this medication need to be stored? Store Trulicity in the refrigerator. Do not freeze or use Trulicity that has been frozen.  You may store your Trulicity pens at room temperature for up to 14 days.     How should I dispose of this medication? Used Trulicity pens should discarded after each use (for single use only). If your doctor decides to stop this medication, take to your local police station for proper disposal. Some pharmacies also have take-back bins for medication drop-off.     Resources/Support   How can I remind myself to take this medication? You can download reminder apps to help you manage your refills, or set an alarm on your phone to remind you.    Is financial support available?  [facility] can provide co-pay cards if you have commercial insurance or  patient assistance if you have Medicare or no insurance.    Which vaccines are recommended for me? Talk to your doctor about these vaccines: Flu, Coronavirus (COVID-19), Pneumococcal (pneumonia), Tdap, Hepatitis B, Zoster (shingles)         Adherence, Self-Administration, and Current Therapy Problems  Adherence related to the patient's specialty therapy was discussed with the patient. The Adherence segment of this outreach has been reviewed and updated.     Is there a concern with patient's ability to self administer the medication correctly and without issue?: No  Were any potential barriers to adherence identified during the initial assessment or patient education?: No  Are there any concerns regarding the patient's understanding of the importance of medication adherence?: No    Adherence Questions  Linked Medication(s) Assessed: Semaglutide  On average, how many doses/injections does the patient miss per month?: 0  What are the identified reasons for non-adherence or missed doses? : no problems identified  What is the estimated medication adherence level?: %  Based on the patient/caregiver response and refill history, does this patient require an MTP to track adherence improvements?: no    Additional Barriers to Patient Self-Administration: lela  Methods for Supporting Patient Self-Administration: n/a    Open Medication Therapy Problems  No medication therapy recommendations to display    Adverse Drug Reactions  Medication tolerability: Tolerating with no to minimal ADRs  Medication plan: Continue therapy with normal follow-up  Plan for ADR Management: n/a    Goals of Therapy  Goals related to the patient's specialty therapy were discussed with the patient. The Patient Goals segment of this outreach has been reviewed and updated.   Goals Addressed Today        Specialty Pharmacy General Goal      A1c < 7%    Lab Results   Component Value Date    HGBA1C 7.1 (H) 12/21/2023                   Quality of Life  Assessment   Quality of Life related to the patient's enrollment in the patient management program and services provided was discussed with the patient. The QOL segment of this outreach has been reviewed and updated.    Quality of Life Improvement Scale: 6-A little better    Reassessment Plan & Follow-Up  1. Medication Therapy Changes: Start trulicity 0.75 mg weekly for 4 weeks, then increase to 1.5 mg weekly if able to tolerate. Stop ozempic.  2. Related Plans, Therapy Recommendations, or Therapy Problems to Be Addressed: none  3. Pharmacist to perform regular assessments no more than (6) months from the previous assessment.  4. Care Coordinator to set up future refill outreaches, coordinate prescription delivery, and escalate clinical questions to pharmacist.    Attestation  Therapeutic appropriateness: Appropriate   I attest the patient was actively involved in and has agreed to the above plan of care. If the prescribed therapy is at any point deemed not appropriate based on the current or future assessments, a consultation will be initiated with the patient's specialty care provider to determine the best course of action. The revised plan of therapy will be documented along with any required assessments and/or additional patient education provided.     Clari Monique, PharmD  Clinical Specialty Pharmacist, Endocrinology  3/28/2024  14:22 EDT

## 2024-04-11 ENCOUNTER — SPECIALTY PHARMACY (OUTPATIENT)
Dept: ENDOCRINOLOGY | Age: 44
End: 2024-04-11
Payer: COMMERCIAL

## 2024-04-11 DIAGNOSIS — E11.9 TYPE 2 DIABETES MELLITUS WITHOUT COMPLICATION, WITHOUT LONG-TERM CURRENT USE OF INSULIN: ICD-10-CM

## 2024-04-11 RX ORDER — METFORMIN HYDROCHLORIDE 500 MG/1
1000 TABLET, EXTENDED RELEASE ORAL DAILY
Qty: 60 TABLET | Refills: 3 | Status: SHIPPED | OUTPATIENT
Start: 2024-04-11 | End: 2024-07-10

## 2024-04-11 NOTE — TELEPHONE ENCOUNTER
Rx Refill Note  Requested Prescriptions     Pending Prescriptions Disp Refills    metFORMIN ER (GLUCOPHAGE-XR) 500 MG 24 hr tablet 60 tablet 3     Sig: Take 2 tablets by mouth Daily for 90 days.      Last office visit with prescribing clinician: 3/21/2024   Last telemedicine visit with prescribing clinician: Visit date not found   Next office visit with prescribing clinician: 9/26/2024                         Would you like a call back once the refill request has been completed: [] Yes [] No    If the office needs to give you a call back, can they leave a voicemail: [] Yes [] No    Liz Tapia  04/11/24, 09:05 EDT

## 2024-04-24 ENCOUNTER — SPECIALTY PHARMACY (OUTPATIENT)
Dept: ENDOCRINOLOGY | Age: 44
End: 2024-04-24
Payer: COMMERCIAL

## 2024-04-24 NOTE — PROGRESS NOTES
Specialty Pharmacy Refill Coordination Note     Siena is a 44 y.o. male contacted today regarding refills of  1 specialty medication(s).    Reviewed and verified with patient:       Specialty medication(s) and dose(s) confirmed: yes    Refill Questions      Flowsheet Row Most Recent Value   Changes to allergies? No   Changes to medications? No   New conditions or infections since last clinic visit No   Unplanned office visit, urgent care, ED, or hospital admission in the last 4 weeks  No   How does patient/caregiver feel medication is working? Good   Financial problems or insurance changes  No   Since the previous refill, were any specialty medication doses or scheduled injections missed or delayed?  No   Does this patient require a clinical escalation to a pharmacist? No            Delivery Questions      Flowsheet Row Most Recent Value   Delivery method  at Pharmacy   Delivery address verified with patient/caregiver? Yes   Delivery address Home   Number of medications in delivery 1   Medication(s) being filled and delivered Dulaglutide   Doses left of specialty medications 1   Copay verified? Yes   Copay amount 25   Copay form of payment Pay at pickup                   Follow-up: 25 day(s)     Maria L Dan, Pharmacy Technician  Specialty Pharmacy Technician

## 2024-05-17 ENCOUNTER — SPECIALTY PHARMACY (OUTPATIENT)
Dept: ENDOCRINOLOGY | Age: 44
End: 2024-05-17
Payer: COMMERCIAL

## 2024-05-17 NOTE — PROGRESS NOTES
Specialty Pharmacy Refill Coordination Note     Siena is a 44 y.o. male contacted today regarding refills of  4 specialty medication(s).    Reviewed and verified with patient:       Specialty medication(s) and dose(s) confirmed: yes    Refill Questions      Flowsheet Row Most Recent Value   Changes to allergies? No   Changes to medications? No   New conditions or infections since last clinic visit No   Unplanned office visit, urgent care, ED, or hospital admission in the last 4 weeks  No   How does patient/caregiver feel medication is working? Good   Financial problems or insurance changes  No   Since the previous refill, were any specialty medication doses or scheduled injections missed or delayed?  No   Does this patient require a clinical escalation to a pharmacist? No                       Follow-up: 25 day(s)     Maria L Dan, Pharmacy Technician  Specialty Pharmacy Technician

## 2024-06-10 ENCOUNTER — SPECIALTY PHARMACY (OUTPATIENT)
Dept: ENDOCRINOLOGY | Age: 44
End: 2024-06-10
Payer: COMMERCIAL

## 2024-06-10 ENCOUNTER — TELEPHONE (OUTPATIENT)
Dept: ENDOCRINOLOGY | Age: 44
End: 2024-06-10
Payer: COMMERCIAL

## 2024-06-10 RX ORDER — ALLOPURINOL 300 MG/1
300 TABLET ORAL DAILY
Qty: 90 TABLET | Refills: 0 | Status: CANCELLED | OUTPATIENT
Start: 2024-06-10

## 2024-06-10 RX ORDER — ALLOPURINOL 300 MG/1
300 TABLET ORAL DAILY
Qty: 30 TABLET | Refills: 2 | Status: SHIPPED | OUTPATIENT
Start: 2024-06-10 | End: 2024-09-08

## 2024-06-10 NOTE — PROGRESS NOTES
Specialty Pharmacy Refill Coordination Note     Siena is a 44 y.o. male contacted today regarding refills of  5 specialty medication(s).    Reviewed and verified with patient:       Specialty medication(s) and dose(s) confirmed: yes    Refill Questions      Flowsheet Row Most Recent Value   Changes to allergies? No   Changes to medications? No   New conditions or infections since last clinic visit No   Unplanned office visit, urgent care, ED, or hospital admission in the last 4 weeks  No   How does patient/caregiver feel medication is working? Good   Financial problems or insurance changes  No   Since the previous refill, were any specialty medication doses or scheduled injections missed or delayed?  No   Does this patient require a clinical escalation to a pharmacist? No            Delivery Questions      Flowsheet Row Most Recent Value   Delivery method  at Pharmacy   Delivery address verified with patient/caregiver? Yes   Delivery address Home   Number of medications in delivery 5   Medication(s) being filled and delivered Lisinopril (Ace Inhibitors), Rosuvastatin Calcium, Allopurinol (Gout Agents), Dulaglutide, Metformin Hcl (Biguanides)   Doses left of specialty medications 1 week   Copay verified? Yes   Copay amount 50.83   Copay form of payment Pay at pickup                   Follow-up: 25 day(s)     Maria L Dan, Pharmacy Technician  Specialty Pharmacy Technician         No

## 2024-06-11 ENCOUNTER — SPECIALTY PHARMACY (OUTPATIENT)
Dept: ENDOCRINOLOGY | Age: 44
End: 2024-06-11
Payer: COMMERCIAL

## 2024-06-11 NOTE — PROGRESS NOTES
Specialty Pharmacy Patient Management Program  Endocrinology Reassessment     Siena Pop Jr. was referred by an Endocrinology provider to the Endocrinology Patient Management program offered by [facility] for Type 2 Diabetes. A follow-up outreach was conducted, including assessment of continued therapy appropriateness, medication adherence, and side effect incidence and management for Trulicity.    Changes to Insurance Coverage or Financial Support  [employer] and Copay card     Relevant Past Medical History and Comorbidities  Relevant medical history and concomitant health conditions were discussed with the patient. The patient's chart has been reviewed for relevant past medical history and comorbid health conditions and updated as necessary.   No past medical history on file.  Social History     Socioeconomic History    Marital status:    Tobacco Use    Smoking status: Never    Smokeless tobacco: Never   Substance and Sexual Activity    Alcohol use: Yes     Comment: 1-2 drinks week     Problem list reviewed by Geno Cormier RPH on 6/11/2024 at  2:37 PM    Hospitalizations and Urgent Care Since Last Assessment  ED Visits, Admissions, or Hospitalizations: None reported or documented   Urgent Office Visits: None reported or documented     Allergies  Known allergies and reactions were discussed with the patient. The patient's chart has been reviewed for allergy information and updated as necessary.   Allergies   Allergen Reactions    Losartan Other (See Comments)     Severe back pain      Allergies reviewed by Geno Cormier RPH on 6/11/2024 at  2:36 PM    Relevant Laboratory Values  Relevant laboratory values were discussed with the patient. The following specialty medication dose adjustment(s) are recommended: No adjustments needed at this time.   A1C Last 3 Results          12/21/2023    10:58   HGBA1C Last 3 Results   Hemoglobin A1C 7.1      Lab Results   Component Value Date     HGBA1C 7.1 (H) 12/21/2023     Lab Results   Component Value Date    GLUCOSE 151 (H) 12/21/2023    CALCIUM 9.6 12/21/2023     12/21/2023    K 4.4 12/21/2023    CO2 23 12/21/2023     12/21/2023    BUN 18 12/21/2023    CREATININE 1.03 12/21/2023    BCR 17 12/21/2023     Lab Results   Component Value Date    CHLPL 247 (H) 12/21/2023    TRIG 439 (H) 12/21/2023    HDL 32 (L) 12/21/2023     (H) 12/21/2023     Microalbumin          12/21/2023    10:58   Microalbumin   Microalbumin, Urine <3.0      Current Medication List  This medication list has been reviewed with the patient and evaluated for any interactions or necessary modifications/recommendations, and updated to include all prescription medications, OTC medications, and supplements the patient is currently taking.  This list reflects what is contained in the patient's profile, which has also been marked as reviewed to communicate to other providers it is the most up to date version of the patient's current medication therapy.     Current Outpatient Medications:     allopurinol (ZYLOPRIM) 300 MG tablet, Take 1 tablet by mouth Daily for 90 days., Disp: 30 tablet, Rfl: 2    Cholecalciferol (VITAMIN D-3 PO), Take  by mouth., Disp: , Rfl:     Dulaglutide (Trulicity) 1.5 MG/0.5ML solution pen-injector, Inject 1.5 mg under the skin into the appropriate area as directed 1 (One) Time Per Week. If able to tolerate 0.75 mg weekly for 4 weeks, Disp: 2 mL, Rfl: 3    lisinopril (Zestril) 2.5 MG tablet, Take 1 tablet by mouth Daily., Disp: 30 tablet, Rfl: 11    metFORMIN ER (GLUCOPHAGE-XR) 500 MG 24 hr tablet, Take 2 tablets by mouth Daily for 90 days., Disp: 60 tablet, Rfl: 3    Multiple Vitamins-Minerals (Multi For Him) capsule, Take  by mouth., Disp: , Rfl:     rosuvastatin (Crestor) 10 MG tablet, Take 1 tablet by mouth Every Night., Disp: 30 tablet, Rfl: 11    Medicines reviewed by Geno Cormier RP on 6/11/2024 at  2:36 PM    Drug Interactions  No  clinically significant drug interactions     Recommended Medications Assessment  Aspirin: Not Taking Currently  Statin: Currently Taking   ACEi/ARB: Currently Taking     Adverse Drug Reactions  Medication tolerability: Tolerating with no to minimal ADRs  Medication plan: Continue therapy with normal follow-up  Plan for ADR Management: No ADRs reported     Adherence, Self-Administration, and Current Therapy Problems  Adherence related to the patient's specialty therapy was discussed with the patient. The Adherence segment of this outreach has been reviewed and updated.     Adherence Questions  Linked Medication(s) Assessed: Dulaglutide  On average, how many doses/injections does the patient miss per month?: 0  What are the identified reasons for non-adherence or missed doses? : no problems identified  What is the estimated medication adherence level?: %  Based on the patient/caregiver response and refill history, does this patient require an MTP to track adherence improvements?: no    Additional Barriers to Patient Self-Administration: None reported or documented   Methods for Supporting Patient Self-Administration: Continue to follow with fills and clinical assessments     Open Medication Therapy Problems  No medication therapy recommendations to display    Goals of Therapy  Goals related to the patient's specialty therapy were discussed with the patient. The Patient Goals segment of this outreach has been reviewed and updated.   Goals Addressed Today    None         Quality of Life Assessment   Quality of Life related to the patient's enrollment in the patient management program and services provided was discussed with the patient. The QOL segment of this outreach has been reviewed and updated.  Quality of Life Improvement Scale: 7-Somewhat better    Reassessment Plan & Follow-Up  1. Medication Therapy Changes: Continue to increase Trulicity as tolerated, currently on 1.5 mg weekly  2. Related Plans, Therapy  Recommendations, or Issues to Be Addressed: No issues  3. Pharmacist to perform regular assessments no more than (6) months from the previous assessment.  4. Care Coordinator to set up future refill outreaches, coordinate prescription delivery, and escalate clinical questions to pharmacist.    Attestation  Therapeutic appropriateness: Appropriate   I attest the patient was actively involved in and has agreed to the above plan of care.  If the prescribed therapy is at any point deemed not appropriate based on the current or future assessments, a consultation will be initiated with the patient's specialty care provider to determine the best course of action. The revised plan of therapy will be documented along with any required assessments and/or additional patient education provided.     Ann Cormier, Arlene, MM  Clinical Specialty Pharmacist, Endocrinology  6/11/2024  14:37 EDT

## 2024-07-05 ENCOUNTER — SPECIALTY PHARMACY (OUTPATIENT)
Dept: ENDOCRINOLOGY | Age: 44
End: 2024-07-05
Payer: COMMERCIAL

## 2024-07-05 NOTE — PROGRESS NOTES
Specialty Pharmacy Refill Coordination Note     Siena is a 44 y.o. male contacted today regarding refills of  5 specialty medication(s).    Reviewed and verified with patient:       Specialty medication(s) and dose(s) confirmed: yes    Refill Questions      Flowsheet Row Most Recent Value   Changes to allergies? No   Changes to medications? No   New conditions or infections since last clinic visit No   Unplanned office visit, urgent care, ED, or hospital admission in the last 4 weeks  No   How does patient/caregiver feel medication is working? Good   Financial problems or insurance changes  No   Since the previous refill, were any specialty medication doses or scheduled injections missed or delayed?  No   Does this patient require a clinical escalation to a pharmacist? No            Delivery Questions      Flowsheet Row Most Recent Value   Delivery method  at Pharmacy   Delivery address verified with patient/caregiver? Yes   Delivery address Home   Number of medications in delivery 5   Medication(s) being filled and delivered Allopurinol (Gout Agents), Lisinopril (Ace Inhibitors), Rosuvastatin Calcium, Dulaglutide, Metformin Hcl (Biguanides)   Doses left of specialty medications 1 week   Copay verified? Yes   Copay amount 50.63   Copay form of payment Pay at pickup   Ship Date na   Delivery Date na   Signature Required No                   Follow-up: 25 day(s)     Maria L Dan, Pharmacy Technician  Specialty Pharmacy Technician

## 2024-08-12 ENCOUNTER — SPECIALTY PHARMACY (OUTPATIENT)
Dept: ENDOCRINOLOGY | Age: 44
End: 2024-08-12
Payer: COMMERCIAL

## 2024-08-12 DIAGNOSIS — E11.9 TYPE 2 DIABETES MELLITUS WITHOUT COMPLICATION, WITHOUT LONG-TERM CURRENT USE OF INSULIN: ICD-10-CM

## 2024-08-12 RX ORDER — METFORMIN HYDROCHLORIDE 500 MG/1
1000 TABLET, EXTENDED RELEASE ORAL DAILY
Qty: 60 TABLET | Refills: 3 | Status: SHIPPED | OUTPATIENT
Start: 2024-08-12 | End: 2024-11-10

## 2024-08-12 RX ORDER — DULAGLUTIDE 1.5 MG/.5ML
1.5 INJECTION, SOLUTION SUBCUTANEOUS WEEKLY
Qty: 2 ML | Refills: 3 | Status: SHIPPED | OUTPATIENT
Start: 2024-08-12

## 2024-08-12 NOTE — TELEPHONE ENCOUNTER
Rx Refill Note  Requested Prescriptions     Pending Prescriptions Disp Refills    Dulaglutide (Trulicity) 1.5 MG/0.5ML solution pen-injector 2 mL 3     Sig: Inject 1.5 mg under the skin into the appropriate area as directed 1 (One) Time Per Week. If able to tolerate 0.75 mg weekly for 4 weeks    metFORMIN ER (GLUCOPHAGE-XR) 500 MG 24 hr tablet 60 tablet 3     Sig: Take 2 tablets by mouth Daily for 90 days.      Last office visit with prescribing clinician: 3/21/2024   Last telemedicine visit with prescribing clinician: Visit date not found   Next office visit with prescribing clinician: 9/26/2024                         Would you like a call back once the refill request has been completed: [] Yes [] No    If the office needs to give you a call back, can they leave a voicemail: [] Yes [] No    Liz Tapia  08/12/24, 14:45 EDT

## 2024-08-12 NOTE — PROGRESS NOTES
Specialty Pharmacy Refill Coordination Note     Siena is a 44 y.o. male contacted today regarding refills of  5 specialty medication(s).    Reviewed and verified with patient:       Specialty medication(s) and dose(s) confirmed: yes    Refill Questions      Flowsheet Row Most Recent Value   Changes to allergies? No   Changes to medications? No   New conditions or infections since last clinic visit No   Unplanned office visit, urgent care, ED, or hospital admission in the last 4 weeks  No   How does patient/caregiver feel medication is working? Good   Financial problems or insurance changes  No   Since the previous refill, were any specialty medication doses or scheduled injections missed or delayed?  No   Does this patient require a clinical escalation to a pharmacist? No            Delivery Questions      Flowsheet Row Most Recent Value   Delivery method  at Pharmacy   Delivery address verified with patient/caregiver? Yes   Delivery address Home   Number of medications in delivery 5   Medication(s) being filled and delivered Rosuvastatin Calcium (Hmg Coa Reductase Inhibitors), Dulaglutide, Allopurinol (Gout Agents), Lisinopril (Ace Inhibitors), Metformin Hcl (Biguanides)   Doses left of specialty medications 1 week   Copay verified? Yes   Copay amount 50.63   Copay form of payment Pay at pickup   Ship Date N/A   Delivery Date N/A   Signature Required No                   Follow-up: 25 day(s)     Maria L Dan, Pharmacy Technician  Specialty Pharmacy Technician

## 2024-09-16 RX ORDER — ALLOPURINOL 300 MG/1
300 TABLET ORAL DAILY
Qty: 30 TABLET | Refills: 0 | Status: SHIPPED | OUTPATIENT
Start: 2024-09-16

## 2024-09-19 ENCOUNTER — SPECIALTY PHARMACY (OUTPATIENT)
Dept: ENDOCRINOLOGY | Age: 44
End: 2024-09-19
Payer: COMMERCIAL

## 2024-09-27 ENCOUNTER — TELEPHONE (OUTPATIENT)
Dept: ENDOCRINOLOGY | Age: 44
End: 2024-09-27
Payer: COMMERCIAL

## 2024-10-15 NOTE — TELEPHONE ENCOUNTER
Rx Refill Note  Requested Prescriptions     Pending Prescriptions Disp Refills    allopurinol (ZYLOPRIM) 300 MG tablet 30 tablet 0     Sig: Take 1 tablet by mouth Daily.      Last office visit with prescribing clinician: Visit date not found   Last telemedicine visit with prescribing clinician: Visit date not found   Next office visit with prescribing clinician: Visit date not found                         Would you like a call back once the refill request has been completed: [] Yes [] No    If the office needs to give you a call back, can they leave a voicemail: [] Yes [] No    Liz Tapia  10/15/24, 08:09 EDT

## 2024-10-17 ENCOUNTER — SPECIALTY PHARMACY (OUTPATIENT)
Dept: ENDOCRINOLOGY | Age: 44
End: 2024-10-17
Payer: COMMERCIAL

## 2024-10-17 RX ORDER — ALLOPURINOL 300 MG/1
300 TABLET ORAL DAILY
Qty: 30 TABLET | Refills: 0 | OUTPATIENT
Start: 2024-10-17

## 2024-10-17 NOTE — PROGRESS NOTES
Specialty Pharmacy Refill Coordination Note     Siena is a 44 y.o. male contacted today regarding refills of  4 specialty medication(s).    Reviewed and verified with patient:       Specialty medication(s) and dose(s) confirmed: yes    Refill Questions      Flowsheet Row Most Recent Value   Changes to allergies? No   Changes to medications? No   New conditions or infections since last clinic visit No   Unplanned office visit, urgent care, ED, or hospital admission in the last 4 weeks  No   How does patient/caregiver feel medication is working? Good   Financial problems or insurance changes  No   Since the previous refill, were any specialty medication doses or scheduled injections missed or delayed?  No   Does this patient require a clinical escalation to a pharmacist? No            Delivery Questions      Flowsheet Row Most Recent Value   Delivery method  at Pharmacy   Delivery address verified with patient/caregiver? Yes   Delivery address Home   Number of medications in delivery 4   Medication(s) being filled and delivered metFORMIN HCl (GLUCOPHAGE-XR), Lisinopril (PRINIVIL,ZESTRIL), Rosuvastatin Calcium (CRESTOR), Dulaglutide (Trulicity)   Doses left of specialty medications 1   Copay verified? Yes   Copay amount 40.37   Copay form of payment Pay at pickup   Signature Required No                   Follow-up: 25 day(s)     Maria L Dan, Pharmacy Technician  Specialty Pharmacy Technician

## 2024-11-12 RX ORDER — ALLOPURINOL 300 MG/1
300 TABLET ORAL DAILY
Qty: 30 TABLET | Refills: 0 | OUTPATIENT
Start: 2024-11-12

## 2024-11-12 NOTE — TELEPHONE ENCOUNTER
Rx Refill Note  Requested Prescriptions     Pending Prescriptions Disp Refills    allopurinol (ZYLOPRIM) 300 MG tablet 30 tablet 0     Sig: Take 1 tablet by mouth Daily.      Last office visit with prescribing clinician: Visit date not found   Last telemedicine visit with prescribing clinician: Visit date not found   Next office visit with prescribing clinician: Visit date not found                         Would you like a call back once the refill request has been completed: [] Yes [] No    If the office needs to give you a call back, can they leave a voicemail: [] Yes [] No    Georgia Tapia MA  11/12/24, 07:36 EST

## 2024-11-25 ENCOUNTER — SPECIALTY PHARMACY (OUTPATIENT)
Dept: ENDOCRINOLOGY | Age: 44
End: 2024-11-25
Payer: COMMERCIAL

## 2024-11-25 NOTE — PROGRESS NOTES
Specialty Pharmacy Patient Management Program  Endocrinology Reassessment     Siena Pop Jr. was referred by an Endocrinology provider to the Endocrinology Patient Management program offered by [facility] for Type 2 Diabetes. A follow-up outreach was conducted, including assessment of continued therapy appropriateness, medication adherence, and side effect incidence and management for Trulicity.    Changes to Insurance Coverage or Financial Support  No changes    Relevant Past Medical History and Comorbidities  Relevant medical history and concomitant health conditions were discussed with the patient. The patient's chart has been reviewed for relevant past medical history and comorbid health conditions and updated as necessary.   No past medical history on file.  Social History     Socioeconomic History    Marital status:    Tobacco Use    Smoking status: Never    Smokeless tobacco: Never   Substance and Sexual Activity    Alcohol use: Yes     Comment: 1-2 drinks week     Problem list reviewed by Clari Monique PharmD on 11/25/2024 at  3:50 PM    Hospitalizations and Urgent Care Since Last Assessment  ED Visits, Admissions, or Hospitalizations: none  Urgent Office Visits: none    Allergies  Known allergies and reactions were discussed with the patient. The patient's chart has been reviewed for allergy information and updated as necessary.   Allergies   Allergen Reactions    Losartan Other (See Comments)     Severe back pain      Allergies reviewed by Clari Monique, Arlene on 11/25/2024 at  3:50 PM    Relevant Laboratory Values  Relevant laboratory values were discussed with the patient. The following specialty medication dose adjustment(s) are recommended: n/a  A1C Last 3 Results          12/21/2023    10:58   HGBA1C Last 3 Results   Hemoglobin A1C 7.1      Lab Results   Component Value Date    HGBA1C 7.1 (H) 12/21/2023     Lab Results   Component Value Date    GLUCOSE 151 (H)  12/21/2023    CALCIUM 9.6 12/21/2023     12/21/2023    K 4.4 12/21/2023    CO2 23 12/21/2023     12/21/2023    BUN 18 12/21/2023    CREATININE 1.03 12/21/2023    BCR 17 12/21/2023     Lab Results   Component Value Date    CHLPL 247 (H) 12/21/2023    TRIG 439 (H) 12/21/2023    HDL 32 (L) 12/21/2023     (H) 12/21/2023     Microalbumin          12/21/2023    10:58   Microalbumin   Microalbumin, Urine <3.0      Current Medication List  This medication list has been reviewed with the patient and evaluated for any interactions or necessary modifications/recommendations, and updated to include all prescription medications, OTC medications, and supplements the patient is currently taking.  This list reflects what is contained in the patient's profile, which has also been marked as reviewed to communicate to other providers it is the most up to date version of the patient's current medication therapy.     Current Outpatient Medications:     allopurinol (ZYLOPRIM) 300 MG tablet, Take 1 tablet by mouth Daily., Disp: 30 tablet, Rfl: 0    Cholecalciferol (VITAMIN D-3 PO), Take  by mouth., Disp: , Rfl:     Dulaglutide (Trulicity) 1.5 MG/0.5ML solution auto-injector, Inject 1.5 mg under the skin into the appropriate area as directed 1 (One) Time Per Week. If able to tolerate 0.75 mg weekly for 4 weeks, Disp: 2 mL, Rfl: 3    lisinopril (Zestril) 2.5 MG tablet, Take 1 tablet by mouth Daily., Disp: 30 tablet, Rfl: 11    metFORMIN ER (GLUCOPHAGE-XR) 500 MG 24 hr tablet, Take 2 tablets by mouth Daily for 90 days., Disp: 60 tablet, Rfl: 3    Multiple Vitamins-Minerals (Multi For Him) capsule, Take  by mouth., Disp: , Rfl:     rosuvastatin (Crestor) 10 MG tablet, Take 1 tablet by mouth Every Night., Disp: 30 tablet, Rfl: 11    Medicines reviewed by Clari Monique, PharmD on 11/25/2024 at  3:50 PM    Drug Interactions  none    Recommended Medications Assessment  Aspirin: Not Taking Currently  Statin: Currently Taking    ACEi/ARB: Currently Taking     Adverse Drug Reactions  Medication tolerability: Tolerating with no to minimal ADRs  Medication plan: Continue therapy with normal follow-up  Plan for ADR Management: n/a    Adherence, Self-Administration, and Current Therapy Problems  Adherence related to the patient's specialty therapy was discussed with the patient. The Adherence segment of this outreach has been reviewed and updated.     Adherence Questions  Linked Medication(s) Assessed: Dulaglutide (Trulicity)  On average, how many doses/injections does the patient miss per month?: 0  What are the identified reasons for non-adherence or missed doses? : no problems identified  What is the estimated medication adherence level?: %  Based on the patient/caregiver response and refill history, does this patient require an MTP to track adherence improvements?: no    Additional Barriers to Patient Self-Administration: none  Methods for Supporting Patient Self-Administration: n/a    Open Medication Therapy Problems  No medication therapy recommendations to display    Goals of Therapy  Goals related to the patient's specialty therapy were discussed with the patient. The Patient Goals segment of this outreach has been reviewed and updated.   Goals Addressed Today        Specialty Pharmacy General Goal      A1c < 7%    Lab Results   Component Value Date    HGBA1C 7.1 (H) 12/21/2023     Reviewed 11/25/24: A1c slightly above goal. On track for now and will continue to monitor.              Quality of Life Assessment   Quality of Life related to the patient's enrollment in the patient management program and services provided was discussed with the patient. The QOL segment of this outreach has been reviewed and updated.  Quality of Life Improvement Scale: 9-A good deal better    Reassessment Plan & Follow-Up  1. Medication Therapy Changes: No changes  2. Related Plans, Therapy Recommendations, or Issues to Be Addressed: no issues  3.  Pharmacist to perform regular assessments no more than (6) months from the previous assessment.  4. Care Coordinator to set up future refill outreaches, coordinate prescription delivery, and escalate clinical questions to pharmacist.    Attestation  Therapeutic appropriateness: Appropriate   I attest the patient was actively involved in and has agreed to the above plan of care.  If the prescribed therapy is at any point deemed not appropriate based on the current or future assessments, a consultation will be initiated with the patient's specialty care provider to determine the best course of action. The revised plan of therapy will be documented along with any required assessments and/or additional patient education provided.     Clari Monique, PharmD  Clinical Specialty Pharmacist, Endocrinology  11/25/2024  15:52 EST    Discussed the aforementioned information with the patient via Telephone.            Specialty Pharmacy Patient Management Program  Endocrinology Refill Outreach      Siena is a 44 y.o. male contacted today regarding refills of his medication(s).    Specialty medication(s) and dose(s) confirmed: trulicity    Refill Questions      Flowsheet Row Most Recent Value   Changes to allergies? No   Changes to medications? No   New conditions or infections since last clinic visit No   Unplanned office visit, urgent care, ED, or hospital admission in the last 4 weeks  No   How does patient/caregiver feel medication is working? Very good   Financial problems or insurance changes  No   Since the previous refill, were any specialty medication doses or scheduled injections missed or delayed?  No   Does this patient require a clinical escalation to a pharmacist? No          Delivery Questions      Flowsheet Row Most Recent Value   Delivery method  at Pharmacy   Number of medications in delivery 1   Medication(s) being filled and delivered Dulaglutide (Trulicity)   Copay verified? Yes   Copay amount 25   Copay  form of payment Pay at pickup            Follow-Up: 28 days    Clari Monique, PharmD  Clinical Specialty Pharmacist, Endocrinology  11/25/2024  15:52 EST

## 2024-11-27 ENCOUNTER — TELEPHONE (OUTPATIENT)
Dept: ENDOCRINOLOGY | Age: 44
End: 2024-11-27
Payer: COMMERCIAL

## 2024-11-27 NOTE — TELEPHONE ENCOUNTER
Called and left patient a message to schedule a follow up appointment.    This is the third attempt trying to reach out to patient, sending a letter.

## 2024-12-18 ENCOUNTER — SPECIALTY PHARMACY (OUTPATIENT)
Dept: ENDOCRINOLOGY | Age: 44
End: 2024-12-18
Payer: COMMERCIAL

## 2024-12-30 ENCOUNTER — TELEPHONE (OUTPATIENT)
Dept: ENDOCRINOLOGY | Age: 44
End: 2024-12-30

## 2024-12-30 NOTE — TELEPHONE ENCOUNTER
Caller: Siena Pop Jr.    Relationship to patient: Self    Best call back number: [phone]     Patient is needing: PT CALLING TO SCHEDULE LABS FOR UPCOMING APPT. PLEASE CALL TO SCHEDULE.

## 2024-12-31 ENCOUNTER — TELEPHONE (OUTPATIENT)
Dept: ENDOCRINOLOGY | Age: 44
End: 2024-12-31
Payer: COMMERCIAL

## 2025-01-14 ENCOUNTER — TELEPHONE (OUTPATIENT)
Dept: ENDOCRINOLOGY | Age: 45
End: 2025-01-14

## 2025-01-14 DIAGNOSIS — I10 ESSENTIAL HYPERTENSION: ICD-10-CM

## 2025-01-14 DIAGNOSIS — E78.2 MIXED HYPERLIPIDEMIA: ICD-10-CM

## 2025-01-14 DIAGNOSIS — E11.9 TYPE 2 DIABETES MELLITUS WITHOUT COMPLICATION, WITHOUT LONG-TERM CURRENT USE OF INSULIN: ICD-10-CM

## 2025-01-14 RX ORDER — METFORMIN HYDROCHLORIDE 500 MG/1
1000 TABLET, EXTENDED RELEASE ORAL DAILY
Qty: 60 TABLET | Refills: 0 | Status: SHIPPED | OUTPATIENT
Start: 2025-01-14

## 2025-01-14 RX ORDER — ROSUVASTATIN CALCIUM 10 MG/1
10 TABLET, COATED ORAL NIGHTLY
Qty: 30 TABLET | Refills: 0 | Status: SHIPPED | OUTPATIENT
Start: 2025-01-14

## 2025-01-14 RX ORDER — DULAGLUTIDE 1.5 MG/.5ML
1.5 INJECTION, SOLUTION SUBCUTANEOUS
Qty: 2 ML | Refills: 0 | Status: SHIPPED | OUTPATIENT
Start: 2025-01-14

## 2025-01-14 RX ORDER — LISINOPRIL 2.5 MG/1
2.5 TABLET ORAL DAILY
Qty: 30 TABLET | Refills: 0 | Status: SHIPPED | OUTPATIENT
Start: 2025-01-14

## 2025-01-14 NOTE — TELEPHONE ENCOUNTER
Specialty Pharmacy Patient Management Program  Prescription Refill Request     Patient currently fills medications at  Pharmacy. Needing refill(s) on the following:      Requested Prescriptions     Pending Prescriptions Disp Refills    Dulaglutide (Trulicity) 1.5 MG/0.5ML solution auto-injector 6 mL 0     Sig: Inject 1.5 mg under the skin into the appropriate area as directed Every 7 (Seven) Days.    lisinopril (Zestril) 2.5 MG tablet 30 tablet 11     Sig: Take 1 tablet by mouth Daily.    metFORMIN ER (GLUCOPHAGE-XR) 500 MG 24 hr tablet 60 tablet 3     Sig: Take 2 tablets by mouth Daily for 90 days.    rosuvastatin (Crestor) 10 MG tablet 30 tablet 11     Sig: Take 1 tablet by mouth Every Night.       Last visit: 03/21/24      Next visit: 03/14/25  Canceled visits scheduled 9/26/24 and 1/10/25    Pended for Dr. Nokhbehzaeim to review, and approve if appropriate.       Elaine Florence, PharmD, BCACP, BC-ADM, CDCES  Clinical Specialty Pharmacist, Endocrinology  1/14/2025  15:41 EST

## 2025-01-14 NOTE — TELEPHONE ENCOUNTER
Hub staff attempted to follow warm transfer process and was unsuccessful     Caller: Siena Pop Jr.    Relationship to patient: Self    Best call back number: [phone]    Patient is needing: PATIENT STATES HE WILL NEED LAB ORDERS TO HAVE LABS COMPLETED PRIOR TO HIS APPOINTMENT IN MARCH 2025. PLEASE ADVISE.

## 2025-01-14 NOTE — TELEPHONE ENCOUNTER
Called and let patient know that blood work orders are already in his chart ready for him whenever he is going to get them done.

## 2025-01-16 ENCOUNTER — SPECIALTY PHARMACY (OUTPATIENT)
Dept: ENDOCRINOLOGY | Age: 45
End: 2025-01-16

## 2025-01-16 NOTE — PROGRESS NOTES
Specialty Pharmacy Refill Coordination Note     Siena is a 44 y.o. male contacted today regarding refills of  3 specialty medication(s).    Reviewed and verified with patient:       Specialty medication(s) and dose(s) confirmed: yes    Refill Questions      Flowsheet Row Most Recent Value   Changes to allergies? No   Changes to medications? No   New conditions or infections since last clinic visit No   Unplanned office visit, urgent care, ED, or hospital admission in the last 4 weeks  No   How does patient/caregiver feel medication is working? Good   Financial problems or insurance changes  No   Since the previous refill, were any specialty medication doses or scheduled injections missed or delayed?  No   Does this patient require a clinical escalation to a pharmacist? No            Delivery Questions      Flowsheet Row Most Recent Value   Delivery method [employer]   Delivery address verified with patient/caregiver? Yes   Delivery address Home   Number of medications in delivery 3   Medication(s) being filled and delivered Lisinopril (PRINIVIL,ZESTRIL), Rosuvastatin Calcium (CRESTOR), metFORMIN HCl (GLUCOPHAGE-XR)   Doses left of specialty medications 1 week   Copay verified? Yes   Copay amount $20.00   Copay form of payment Pay at pickup   Signature Required No                   Follow-up: 23 day(s)     Rama Jaeger, Pharmacy Technician  Specialty Pharmacy Technician

## 2025-02-10 DIAGNOSIS — I10 ESSENTIAL HYPERTENSION: ICD-10-CM

## 2025-02-10 DIAGNOSIS — E11.9 TYPE 2 DIABETES MELLITUS WITHOUT COMPLICATION, WITHOUT LONG-TERM CURRENT USE OF INSULIN: ICD-10-CM

## 2025-02-10 DIAGNOSIS — E78.2 MIXED HYPERLIPIDEMIA: ICD-10-CM

## 2025-02-10 NOTE — TELEPHONE ENCOUNTER
Specialty Pharmacy Patient Management Program  Prescription Refill Request     Patient currently fills medications at  Pharmacy. Needing refill(s) on the following:      Requested Prescriptions     Pending Prescriptions Disp Refills    lisinopril (Zestril) 2.5 MG tablet 30 tablet 0     Sig: Take 1 tablet by mouth Daily.    metFORMIN ER (GLUCOPHAGE-XR) 500 MG 24 hr tablet 60 tablet 0     Sig: Take 2 tablets by mouth Daily.    rosuvastatin (Crestor) 10 MG tablet 30 tablet 0     Sig: Take 1 tablet by mouth Every Night.     Lab Results   Component Value Date    HGBA1C 7.1 (H) 12/21/2023      Lab Results   Component Value Date    GLUCOSE 151 (H) 12/21/2023    BUN 18 12/21/2023    CREATININE 1.03 12/21/2023     12/21/2023    K 4.4 12/21/2023     12/21/2023    CALCIUM 9.6 12/21/2023    ALBUMIN 4.5 12/21/2023    ALT 48 (H) 12/21/2023    AST 30 12/21/2023    ALKPHOS 75 12/21/2023    BILITOT 0.5 12/21/2023    BCR 17 12/21/2023     BP Readings from Last 1 Encounters:   03/21/24 126/82      Lab Results   Component Value Date    CHLPL 247 (H) 12/21/2023    TRIG 439 (H) 12/21/2023    HDL 32 (L) 12/21/2023     (H) 12/21/2023         Last visit: 03/21/24  Next visit: 03/14/25    Pended for Dr. Nokhbehzaeim to review, and approve if appropriate.     Elaine Florence, PharmD, BCACP, BC-ADM, CDCES  Clinical Specialty Pharmacist, Endocrinology  2/10/2025  16:07 EST

## 2025-02-11 RX ORDER — LISINOPRIL 2.5 MG/1
2.5 TABLET ORAL DAILY
Qty: 30 TABLET | Refills: 0 | Status: SHIPPED | OUTPATIENT
Start: 2025-02-11

## 2025-02-11 RX ORDER — METFORMIN HYDROCHLORIDE 500 MG/1
1000 TABLET, EXTENDED RELEASE ORAL DAILY
Qty: 60 TABLET | Refills: 0 | Status: SHIPPED | OUTPATIENT
Start: 2025-02-11

## 2025-02-11 RX ORDER — ROSUVASTATIN CALCIUM 10 MG/1
10 TABLET, COATED ORAL NIGHTLY
Qty: 30 TABLET | Refills: 0 | Status: SHIPPED | OUTPATIENT
Start: 2025-02-11

## 2025-02-12 ENCOUNTER — SPECIALTY PHARMACY (OUTPATIENT)
Dept: ENDOCRINOLOGY | Age: 45
End: 2025-02-12

## 2025-02-12 NOTE — PROGRESS NOTES
Specialty Pharmacy Patient Management Program  Refill Outreach     Siena was contacted today regarding refills of their medication(s).    Refill Questions      Flowsheet Row Most Recent Value   Changes to allergies? No   Changes to medications? No   New conditions or infections since last clinic visit No   Unplanned office visit, urgent care, ED, or hospital admission in the last 4 weeks  No   How does patient/caregiver feel medication is working? Good   Financial problems or insurance changes  No   Since the previous refill, were any specialty medication doses or scheduled injections missed or delayed?  No   Does this patient require a clinical escalation to a pharmacist? No            Delivery Questions      Flowsheet Row Most Recent Value   Delivery method  at Pharmacy   Number of medications in delivery 3   Medication(s) being filled and delivered metFORMIN HCl (GLUCOPHAGE-XR), Rosuvastatin Calcium (CRESTOR), Lisinopril (PRINIVIL,ZESTRIL)   Doses left of specialty medications 1 week   Copay verified? Yes   Copay amount $16.26   Copay form of payment Pay at pickup   Signature Required No                 Follow-up: 23 day(s)     Rama Jaeger, Pharmacy Technician  2/12/2025  14:00 EST

## 2025-03-06 ENCOUNTER — TELEPHONE (OUTPATIENT)
Dept: ENDOCRINOLOGY | Age: 45
End: 2025-03-06

## 2025-03-06 NOTE — TELEPHONE ENCOUNTER
Hub staff attempted to follow warm transfer process and was unsuccessful     Caller: MKEHI BALES    Relationship to patient: SELF    Best call back number: [phone]    Patient is needing: PATIENT IS NEEDING TO SCHEDULE HIS LAB APPOINTMENT, PLEASE ADVISE

## 2025-03-08 LAB
25(OH)D3+25(OH)D2 SERPL-MCNC: 33.3 NG/ML (ref 30–100)
ALBUMIN/CREAT UR: <9 MG/G CREAT (ref 0–29)
CHOLEST SERPL-MCNC: 143 MG/DL (ref 0–200)
CREAT UR-MCNC: 35.1 MG/DL
HBA1C MFR BLD: 6.9 % (ref 4.8–5.6)
HDLC SERPL-MCNC: 38 MG/DL (ref 40–60)
IMP & REVIEW OF LAB RESULTS: NORMAL
LDLC SERPL CALC-MCNC: 82 MG/DL (ref 0–100)
MICROALBUMIN UR-MCNC: <3 UG/ML
TRIGL SERPL-MCNC: 128 MG/DL (ref 0–150)
VLDLC SERPL CALC-MCNC: 23 MG/DL (ref 5–40)

## 2025-03-14 ENCOUNTER — SPECIALTY PHARMACY (OUTPATIENT)
Dept: ENDOCRINOLOGY | Age: 45
End: 2025-03-14
Payer: COMMERCIAL

## 2025-03-14 ENCOUNTER — OFFICE VISIT (OUTPATIENT)
Dept: ENDOCRINOLOGY | Age: 45
End: 2025-03-14
Payer: COMMERCIAL

## 2025-03-14 VITALS
DIASTOLIC BLOOD PRESSURE: 72 MMHG | HEIGHT: 72 IN | HEART RATE: 79 BPM | WEIGHT: 235.6 LBS | BODY MASS INDEX: 31.91 KG/M2 | OXYGEN SATURATION: 98 % | SYSTOLIC BLOOD PRESSURE: 128 MMHG

## 2025-03-14 DIAGNOSIS — E11.69 HYPERLIPIDEMIA ASSOCIATED WITH TYPE 2 DIABETES MELLITUS: ICD-10-CM

## 2025-03-14 DIAGNOSIS — E78.5 HYPERLIPIDEMIA ASSOCIATED WITH TYPE 2 DIABETES MELLITUS: ICD-10-CM

## 2025-03-14 DIAGNOSIS — E55.9 VITAMIN D INSUFFICIENCY: ICD-10-CM

## 2025-03-14 DIAGNOSIS — E11.65 TYPE 2 DIABETES MELLITUS WITH HYPERGLYCEMIA, WITHOUT LONG-TERM CURRENT USE OF INSULIN: Primary | ICD-10-CM

## 2025-03-14 PROBLEM — E11.9 TYPE 2 DIABETES MELLITUS WITHOUT COMPLICATION, WITHOUT LONG-TERM CURRENT USE OF INSULIN: Status: RESOLVED | Noted: 2023-12-21 | Resolved: 2025-03-14

## 2025-03-14 PROCEDURE — 99214 OFFICE O/P EST MOD 30 MIN: CPT | Performed by: STUDENT IN AN ORGANIZED HEALTH CARE EDUCATION/TRAINING PROGRAM

## 2025-03-14 RX ORDER — LISINOPRIL 2.5 MG/1
2.5 TABLET ORAL DAILY
Qty: 90 TABLET | Refills: 1 | Status: SHIPPED | OUTPATIENT
Start: 2025-03-14

## 2025-03-14 RX ORDER — METFORMIN HYDROCHLORIDE 500 MG/1
1000 TABLET, EXTENDED RELEASE ORAL DAILY
Qty: 180 TABLET | Refills: 1 | Status: SHIPPED | OUTPATIENT
Start: 2025-03-14

## 2025-03-14 RX ORDER — ROSUVASTATIN CALCIUM 10 MG/1
10 TABLET, COATED ORAL NIGHTLY
Qty: 90 TABLET | Refills: 1 | Status: SHIPPED | OUTPATIENT
Start: 2025-03-14

## 2025-03-14 NOTE — PROGRESS NOTES
Specialty Pharmacy Patient Management Program  Prior Authorization      Prior Authorization for Mounjaro was submitted by fax to [employer]    Scanned copy of PA form in media tab.     Will recheck status on 3/17/25, if no response received.     Elaine Florence PharmD, RAE, [facility]  Clinical Specialty Pharmacist, Endocrinology  3/14/2025  15:34 EDT      _______________________________________________________________________     Attempted to reprocess claim, but it is still not processing through insurance at this time.     Elaine Florence PharmD, RAE, [facility], [facility]  Clinical Specialty Pharmacist, Endocrinology  3/17/2025  11:54 EDT       _______________________________________________________________________     Attempted to reprocess claim, but it is still not processing through insurance at this time.   Elaine Florence PharmD, RAE, [facility], [facility]  Clinical Specialty Pharmacist, Endocrinology  3/18/2025  13:14 EDT

## 2025-03-14 NOTE — PROGRESS NOTES
Specialty Pharmacy Patient Management Program  Refill Outreach     Trulicity is being changed to janina Henry is submitting PA for the mounjaro patient is aware.     Rama Jaeger, Pharmacy Technician  3/14/2025  15:29 EDT

## 2025-03-14 NOTE — PROGRESS NOTES
"Chief Complaint  Type 2 diabetes mellitus with hyperglycemia, without long-t    Subjective        Siena Pop Jr. presents to [facility] ENDOCRINOLOGY for follow up.     History of Present Illness        Last office visit 2024  Initially was diagnosed with prediabetes in      CARYN 65 negative   C-peptide 3.1       A1c 6.9% 2025  MACR < 9 takes lisinopril  Ozmepic caused GI SE   Was able to tolerate Trulicity, discontinued in 2024     Current meds:   Takes metformin 500 mg two tablets daily    Denies diabetic complications        His son has type 2 diabetes and his father also has type 2 diabetes  His dad had a heart attack after age  50    His mother  of CHF before age 60     Denies personal or family history of pancreatitis or pancreatic cancer  Denies personal or family history of thyroid cancer        Has vitamin D deficiency   Takes vitamin D supplements daily      Vitamin D 33.3     Takes vitamin D 2000 units daily         LDL  82  Takes Crestor 10 mg at nightly        Component      Latest Ref Rng 3/7/2025   Total Cholesterol      0 - 200 mg/dL 143    Triglycerides      0 - 150 mg/dL 128    HDL Cholesterol      40 - 60 mg/dL 38 (L)    VLDL Cholesterol Real      5 - 40 mg/dL 23    LDL Cholesterol       0 - 100 mg/dL 82    Creatinine, Urine      Not Estab. mg/dL 35.1    Microalbumin, Urine      Not Estab. ug/mL <3.0    Microalbumin/Creatinine Ratio      0 - 29 mg/g creat <9    25 Hydroxy, Vitamin D      30.0 - 100.0 ng/ml 33.3    Hemoglobin A1C      4.80 - 5.60 % 6.90 (H)    Interpretation Note       Legend:  (L) Low  (H) High    Objective   Vital Signs:  /72 (BP Location: Left arm)   Pulse 79   Ht 182.9 cm (72.01\")   Wt 107 kg (235 lb 9.6 oz)   SpO2 98%   BMI 31.95 kg/m²   Estimated body mass index is 31.95 kg/m² as calculated from the following:    Height as of this encounter: 182.9 cm (72.01\").    Weight as of this encounter: 107 kg (235 lb " 9.6 oz).           Physical Exam  Constitutional:       Appearance: He is obese.   Cardiovascular:      Rate and Rhythm: Normal rate.   Pulmonary:      Effort: Pulmonary effort is normal.      Breath sounds: Normal breath sounds. No wheezing.   Abdominal:      Palpations: Abdomen is soft.      Tenderness: There is no abdominal tenderness.   Musculoskeletal:         General: No swelling.      Cervical back: Neck supple. No tenderness.   Neurological:      Mental Status: He is alert and oriented to person, place, and time.   Psychiatric:         Mood and Affect: Mood normal.        Result Review :  The following data was reviewed by: Mahrokh Nokhbehzaeim, MD on 03/14/2025:       Lipid Panel          3/7/2025    15:33   Lipid Panel   Total Cholesterol 143    Triglycerides 128    HDL Cholesterol 38    VLDL Cholesterol 23    LDL Cholesterol  82       Most Recent A1C          3/7/2025    15:33   HGBA1C Most Recent   Hemoglobin A1C 6.90          A1C Last 3 Results          3/7/2025    15:33   HGBA1C Last 3 Results   Hemoglobin A1C 6.90       Microalbumin          3/7/2025    15:33   Microalbumin   Microalbumin, Urine <3.0                    Assessment and Plan   Diagnoses and all orders for this visit:    1. Type 2 diabetes mellitus with hyperglycemia, without long-term current use of insulin (Primary)  Assessment & Plan:  Diabetes is improving with treatment.   Continue current treatment regimen.  Reminded to bring in blood sugar diary at next visit.  Recommended an ADA diet.  Recommended a Mediterranean style of eating  Regular aerobic exercise.  Discussed ways to avoid symptomatic hypoglycemia.  Discussed sick day management.  Discussed foot care.  Reminded to get yearly retinal exam.  Continue metformin  Start Mounjaro 2.5 mg weekly  Diabetes will be reassessed  4 months    Orders:  -     Tirzepatide 2.5 MG/0.5ML solution auto-injector; Inject 2.5 mg under the skin into the appropriate area as directed 1 (One) Time Per  Week.  Dispense: 2 mL; Refill: 0  -     metFORMIN ER (GLUCOPHAGE-XR) 500 MG 24 hr tablet; Take 2 tablets by mouth Daily.  Dispense: 180 tablet; Refill: 1  -     rosuvastatin (Crestor) 10 MG tablet; Take 1 tablet by mouth Every Night.  Dispense: 90 tablet; Refill: 1  -     lisinopril (Zestril) 2.5 MG tablet; Take 1 tablet by mouth Daily.  Dispense: 90 tablet; Refill: 1    2. Hyperlipidemia associated with type 2 diabetes mellitus  Assessment & Plan:   LDL is improving  Continue the current dose of Crestor  Repeat labs at next visit    Orders:  -     rosuvastatin (Crestor) 10 MG tablet; Take 1 tablet by mouth Every Night.  Dispense: 90 tablet; Refill: 1    3. Vitamin D insufficiency  Assessment & Plan:  Vitamin D level is normal               Follow Up   Return in about 4 months (around 7/14/2025).  Patient was given instructions and counseling regarding his condition or for health maintenance advice. Please see specific information pulled into the AVS if appropriate.

## 2025-03-14 NOTE — ASSESSMENT & PLAN NOTE
Diabetes is improving with treatment.   Continue current treatment regimen.  Reminded to bring in blood sugar diary at next visit.  Recommended an ADA diet.  Recommended a Mediterranean style of eating  Regular aerobic exercise.  Discussed ways to avoid symptomatic hypoglycemia.  Discussed sick day management.  Discussed foot care.  Reminded to get yearly retinal exam.  Continue metformin  Start Mounjaro 2.5 mg weekly  Diabetes will be reassessed  4 months

## 2025-03-14 NOTE — PROGRESS NOTES
Specialty Pharmacy Patient Management Program  Refill Outreach     Siena was contacted today regarding refills of their medication(s).    Refill Questions      Flowsheet Row Most Recent Value   Changes to allergies? No   Changes to medications? No   New conditions or infections since last clinic visit No   Unplanned office visit, urgent care, ED, or hospital admission in the last 4 weeks  No   How does patient/caregiver feel medication is working? Good   Financial problems or insurance changes  No   Since the previous refill, were any specialty medication doses or scheduled injections missed or delayed?  No   Does this patient require a clinical escalation to a pharmacist? No            Delivery Questions      Flowsheet Row Most Recent Value   Delivery method  at Pharmacy   Number of medications in delivery 3   Medication(s) being filled and delivered Lisinopril (PRINIVIL,ZESTRIL), metFORMIN HCl (GLUCOPHAGE-XR), Rosuvastatin Calcium (CRESTOR)   Doses left of specialty medications 1 week   Copay verified? Yes   Copay amount $20.00   Copay form of payment Pay at pickup   Signature Required No                 Follow-up: 83 day(s)     Rama Jaeger, Pharmacy Technician  3/14/2025  15:28 EDT

## 2025-03-19 NOTE — PROGRESS NOTES
Specialty Pharmacy Patient Management Program  Prior Authorization      Prior Authorization for Mounjaro was DENIED          Notified provider      Elaine Florence, PharmD, BCACP, BC-ADM, [facility]  Clinical Specialty Pharmacist, Endocrinology  3/19/2025  15:54 EDT               bilateral UE Active ROM was WFL  (within functional limits)

## 2025-04-28 ENCOUNTER — OFFICE VISIT (OUTPATIENT)
Dept: INTERNAL MEDICINE | Facility: CLINIC | Age: 45
End: 2025-04-28
Payer: COMMERCIAL

## 2025-04-28 VITALS
WEIGHT: 236.6 LBS | HEIGHT: 72 IN | BODY MASS INDEX: 32.05 KG/M2 | SYSTOLIC BLOOD PRESSURE: 125 MMHG | DIASTOLIC BLOOD PRESSURE: 78 MMHG

## 2025-04-28 DIAGNOSIS — E11.65 TYPE 2 DIABETES MELLITUS WITH HYPERGLYCEMIA, WITHOUT LONG-TERM CURRENT USE OF INSULIN: ICD-10-CM

## 2025-04-28 DIAGNOSIS — E11.69 HYPERLIPIDEMIA ASSOCIATED WITH TYPE 2 DIABETES MELLITUS: ICD-10-CM

## 2025-04-28 DIAGNOSIS — E78.5 HYPERLIPIDEMIA ASSOCIATED WITH TYPE 2 DIABETES MELLITUS: ICD-10-CM

## 2025-04-28 DIAGNOSIS — Z00.00 WELLNESS EXAMINATION: ICD-10-CM

## 2025-04-28 DIAGNOSIS — M1A.9XX0 CHRONIC GOUT WITHOUT TOPHUS, UNSPECIFIED CAUSE, UNSPECIFIED SITE: Primary | ICD-10-CM

## 2025-04-28 RX ORDER — ALLOPURINOL 300 MG/1
300 TABLET ORAL DAILY
Qty: 90 TABLET | Refills: 1 | Status: SHIPPED | OUTPATIENT
Start: 2025-04-28

## 2025-04-28 NOTE — PROGRESS NOTES
New Patient Office Visit      Patient Name: Siena Pop Jr.  : 1980   MRN: [id number]   Care Team: Patient Care Team:  Maxx Duke MD as PCP - General (Internal Medicine)    Chief Complaint:    Chief Complaint   Patient presents with    Establish Care    Diabetes    Gout       History of Present Illness: Siena Pop Jr. is a 45 y.o. male who is here today to establish care.  He previously saw PCP in [facility] [place]    He has no acute complaints or concerns other than restarting allopurinol which he was previously on for gout. He states that he was on 300mg for prevention as he has history of frequent flares    He currently follows with endocrinology here at [facility] for T2DM, and is currently on metformin. He did previously try ozempic and did not tolerate well and tried mounjaro for a little but insurance no longer covered it     He currently works as a  for local [employer] and [employer] departments    Subjective      Review of Systems:   Review of Systems - See HPI    Past Medical History:   Past Medical History:   Diagnosis Date    Asthma     Gout     Prediabetes        Past Surgical History:   Past Surgical History:   Procedure Laterality Date    FINGER SURGERY Left 10/2013    KNEE SURGERY Left 2009       Family History:   Family History   Problem Relation Age of Onset    Diabetes type II Mother     Hypertension Mother     Hyperlipidemia Mother     Obesity Mother     Diabetes type II Father     Hypertension Father     Hyperlipidemia Father     Stroke Maternal Grandfather     Stroke Paternal Grandfather        Social History:   Social History     Socioeconomic History    Marital status:    Tobacco Use    Smoking status: Never    Smokeless tobacco: Never   Vaping Use    Vaping status: Never Used   Substance and Sexual Activity    Alcohol use: Yes     Comment: 1-2 drinks week    Drug use: Never    Sexual activity: Defer       Tobacco History:   Social History     Tobacco Use  "  Smoking Status Never   Smokeless Tobacco Never       Medications:     Current Outpatient Medications:     Cholecalciferol (VITAMIN D-3 PO), Take  by mouth., Disp: , Rfl:     lisinopril (Zestril) 2.5 MG tablet, Take 1 tablet by mouth Daily., Disp: 90 tablet, Rfl: 1    metFORMIN ER (GLUCOPHAGE-XR) 500 MG 24 hr tablet, Take 2 tablets by mouth Daily., Disp: 180 tablet, Rfl: 1    Multiple Vitamins-Minerals (Multi For Him) capsule, Take  by mouth., Disp: , Rfl:     rosuvastatin (Crestor) 10 MG tablet, Take 1 tablet by mouth Every Night., Disp: 90 tablet, Rfl: 1    allopurinol (Zyloprim) 300 MG tablet, Take 1 tablet by mouth Daily., Disp: 90 tablet, Rfl: 1    Tirzepatide 2.5 MG/0.5ML solution auto-injector, Inject 2.5 mg under the skin into the appropriate area as directed 1 (One) Time Per Week. (Patient not taking: Reported on 4/28/2025), Disp: 2 mL, Rfl: 0    Allergies:   Allergies   Allergen Reactions    Losartan Other (See Comments)     Severe back pain        Objective     Physical Exam:  Vital Signs:   Vitals:    04/28/25 1437   BP: 125/78   Weight: 107 kg (236 lb 9.6 oz)   Height: 182.9 cm (72.01\")     Body mass index is 32.08 kg/m².     Physical Exam  Vitals reviewed.   Constitutional:       General: He is not in acute distress.     Appearance: Normal appearance. He is not toxic-appearing.   HENT:      Head: Normocephalic and atraumatic.   Eyes:      General: No scleral icterus.     Conjunctiva/sclera: Conjunctivae normal.   Cardiovascular:      Rate and Rhythm: Normal rate and regular rhythm.      Heart sounds: Normal heart sounds.   Pulmonary:      Effort: Pulmonary effort is normal. No respiratory distress.      Breath sounds: Normal breath sounds. No wheezing.   Skin:     General: Skin is warm and dry.   Neurological:      Mental Status: He is alert and oriented to person, place, and time.   Psychiatric:         Mood and Affect: Mood normal.         Behavior: Behavior normal.         Assessment / Plan  "     Assessment/Plan:   Problems Addressed This Visit  Diagnoses and all orders for this visit:    1. Chronic gout without tophus, unspecified cause, unspecified site (Primary)  -     allopurinol (Zyloprim) 300 MG tablet; Take 1 tablet by mouth Daily.  Dispense: 90 tablet; Refill: 1  -     Uric acid; Future    2. Wellness examination  -     Comprehensive Metabolic Panel; Future  -     Lipid Panel With / Chol / HDL Ratio; Future  -     Hemoglobin A1c; Future    3. Type 2 diabetes mellitus with hyperglycemia, without long-term current use of insulin    4. Hyperlipidemia associated with type 2 diabetes mellitus      Reviewed endocrinology OV, labs dating back to 2024    Clinically stable chronic conditions listed above. Will restart allopurinol, recheck uric acid prior to next appt    Diabetes appears very well controlled on current regimen, continue metformin and rosuvastatin. On lisinopril mostly for prevention of nephrology     RTC in 6mo for CPE, labs prior      Plan of care reviewed with patient at the conclusion of today's visit. Education was provided regarding diagnosis and management.  Patient verbalizes understanding of and agreement with management plan.      Follow Up:   Return in about 6 months (around 10/28/2025) for Annual physical, Lab before FU.          Maxx Duke MD  [facility]  [address] [place] [phone]  Fax [phone]

## 2025-04-29 ENCOUNTER — PATIENT ROUNDING (BHMG ONLY) (OUTPATIENT)
Dept: INTERNAL MEDICINE | Facility: CLINIC | Age: 45
End: 2025-04-29
Payer: COMMERCIAL

## 2025-04-29 NOTE — PROGRESS NOTES
April 29, 2025    Hello, may I speak with Siena Pop Jr.?    My name is JADIEL       I am  with [facility]  [address]  [facility] [id number].    Before we get started may I verify your date of birth? 1980    I am calling to officially welcome you to our practice and ask about your recent visit. Is this a good time to talk? yes    Tell me about your visit with us. What things went well?  LEARNED A LOT FROM HIM - HE IS REALLY SMART        We're always looking for ways to make our patients' experiences even better. Do you have recommendations on ways we may improve?  no    Overall were you satisfied with your first visit to our practice? yes       I appreciate you taking the time to speak with me today. Is there anything else I can do for you? no      Thank you, and have a great day.

## 2025-05-21 ENCOUNTER — SPECIALTY PHARMACY (OUTPATIENT)
Dept: ENDOCRINOLOGY | Age: 45
End: 2025-05-21
Payer: COMMERCIAL

## 2025-05-21 NOTE — PROGRESS NOTES
Specialty Pharmacy Patient Management Program  Refill Outreach     Siena was contacted today regarding refills of their medication(s).    Refill Questions      Flowsheet Row Most Recent Value   Changes to allergies? No   Changes to medications? No   New conditions or infections since last clinic visit No   Unplanned office visit, urgent care, ED, or hospital admission in the last 4 weeks  No   How does patient/caregiver feel medication is working? Good   Financial problems or insurance changes  No   Since the previous refill, were any specialty medication doses or scheduled injections missed or delayed?  No   Does this patient require a clinical escalation to a pharmacist? No            Delivery Questions      Flowsheet Row Most Recent Value   Delivery method  at Pharmacy   Number of medications in delivery 3   Medication(s) being filled and delivered metFORMIN HCl (GLUCOPHAGE-XR), Rosuvastatin Calcium (CRESTOR), Lisinopril (PRINIVIL,ZESTRIL)   Doses left of specialty medications 1 week   Copay verified? Yes   Copay amount $20.00   Copay form of payment Pay at pickup   Signature Required No   Do you consent to receive electronic handouts?  No                 Follow-up: 83 day(s)     Rama Jaeger, Pharmacy Technician  5/21/2025  11:16 EDT

## 2025-05-27 ENCOUNTER — SPECIALTY PHARMACY (OUTPATIENT)
Dept: ENDOCRINOLOGY | Age: 45
End: 2025-05-27
Payer: COMMERCIAL

## 2025-05-27 RX ORDER — DAPAGLIFLOZIN 5 MG/1
5 TABLET, FILM COATED ORAL EVERY MORNING
Qty: 30 TABLET | Refills: 1 | Status: SHIPPED | OUTPATIENT
Start: 2025-05-27

## 2025-05-27 NOTE — TELEPHONE ENCOUNTER
Specialty Pharmacy Patient Management Program  Prescription Request     Patient currently fills medications at  Pharmacy.   Requested Prescriptions     Pending Prescriptions Disp Refills    dapagliflozin (Farxiga) 5 MG tablet tablet 30 tablet 1     Sig: Take 1 tablet by mouth Every Morning.       Last visit: 03/14/25  Next visit: 07/25/25    Patient's insurance plan requires trial of 2 first-line oral medications from 2 different classes for at least 3 months prior to coverage for a weekly injectable GLP-1 RA or GIP/GLP-1 RA. Patient agreeable to start Farxiga 5mg daily.   Pended for Dr. Nokhbehzaeim to review, and approve if appropriate.     Elaine Florence, PharmD, BCACP, BC-ADM, CDCES  Clinical Specialty Pharmacist, Endocrinology  5/27/2025  15:30 EDT

## 2025-05-27 NOTE — PROGRESS NOTES
Specialty Pharmacy Patient Management Program  Endocrinology Medication Addition Assessment     Siena Pop Jr. was referred by an Endocrinology provider to the Endocrinology Patient Management Program offered by [facility] for Type 2 Diabetes. This assessment was conducted as a result of a specialty medication addition or substitution. The patient was previously introduced to services offered by [facility], including: regular assessments, refill coordination, curbside pick-up or mail order delivery options, prior authorization maintenance, and financial assistance programs as applicable. The patient was also provided with contact information for the pharmacy team.      An initial outreach was conducted, including assessment of therapy appropriateness and specialty medication education for Farxiga.     A follow-up outreach was NOT conducted, as Trulicity was discontinued as insurance no longer covered the medication.    Insurance Coverage & Financial Support  [employer]      Relevant Past Medical History and Comorbidities  Relevant medical history and concomitant health conditions were discussed with the patient. The patient's chart has been reviewed for relevant past medical history and comorbid conditions and updated as necessary.  Past Medical History:   Diagnosis Date    Asthma     Gout     Prediabetes      Social History     Socioeconomic History    Marital status:    Tobacco Use    Smoking status: Never    Smokeless tobacco: Never   Vaping Use    Vaping status: Never Used   Substance and Sexual Activity    Alcohol use: Yes     Comment: 1-2 drinks week    Drug use: Never    Sexual activity: Defer            Hospitalizations and Urgent Care Since Last Assessment  ED Visits, Admissions, or Hospitalizations: none   Urgent Office Visits: none     Allergies  Known allergies and reactions were discussed with the patient. The patient's chart has been  reviewed for  allergy information and updated as necessary.   Allergies   Allergen Reactions    Losartan Other (See Comments)     Severe back pain             Relevant Laboratory Values  Relevant laboratory values were discussed with the patient. The following specialty medication dose adjustment(s) are recommended: A1c at goal   A1C Last 3 Results          3/7/2025    15:33   HGBA1C Last 3 Results   Hemoglobin A1C 6.90      Lab Results   Component Value Date    HGBA1C 6.90 (H) 03/07/2025     Lab Results   Component Value Date    GLUCOSE 151 (H) 12/21/2023    CALCIUM 9.6 12/21/2023     12/21/2023    K 4.4 12/21/2023    CO2 23 12/21/2023     12/21/2023    BUN 18 12/21/2023    CREATININE 1.03 12/21/2023    EGFRIFAFRI >120 01/20/2022    EGFRIFNONA 108 01/20/2022    BCR 17 12/21/2023    ANIONGAP 13 01/20/2022     Lab Results   Component Value Date    CHLPL 143 03/07/2025    TRIG 128 03/07/2025    HDL 38 (L) 03/07/2025    LDL 82 03/07/2025     Microalbumin          3/7/2025    15:33   Microalbumin   Microalbumin, Urine <3.0        Current Medication List  This medication list has been reviewed with the patient and evaluated for any interactions or necessary modifications/recommendations, and updated to include all prescription medications, OTC medications, and supplements the patient is currently taking.  This list reflects what is contained in the patient's profile, which has also been marked as reviewed to communicate to other providers it is the most up to date version of the patient's current medication therapy.     Current Outpatient Medications:     allopurinol (Zyloprim) 300 MG tablet, Take 1 tablet by mouth Daily., Disp: 90 tablet, Rfl: 1    Cholecalciferol (VITAMIN D-3 PO), Take  by mouth., Disp: , Rfl:     dapagliflozin (Farxiga) 5 MG tablet tablet, Take 1 tablet by mouth Every Morning., Disp: 30 tablet, Rfl: 1    lisinopril (Zestril) 2.5 MG tablet, Take 1 tablet by mouth Daily., Disp: 90 tablet,  Rfl: 1    metFORMIN ER (GLUCOPHAGE-XR) 500 MG 24 hr tablet, Take 2 tablets by mouth Daily., Disp: 180 tablet, Rfl: 1    Multiple Vitamins-Minerals (Multi For Him) capsule, Take  by mouth., Disp: , Rfl:     rosuvastatin (Crestor) 10 MG tablet, Take 1 tablet by mouth Every Night., Disp: 90 tablet, Rfl: 1         Drug Interactions  No Clinically Significant DDIs Were Identified at Present Time Upon Marking Medications Reviewed     Recommended Medications Assessment  Aspirin: Not Taking Currently  Statin: Currently Taking   ACEi/ARB: Currently Taking     Initial Education Provided for Specialty Medication  The patient has been provided with the following education and any applicable administration techniques (i.e. self-injection) have been demonstrated for the therapies indicated. All questions and concerns have been addressed prior to the patient receiving the medication, and the patient has verbalized comprehension of the education and any materials provided. Additional patient education shall be provided and documented upon request by the patient, provider, or payer.    Farxiga® (dapagliflozin)  Medication Expectations   Why am I taking this medication? You could be taking this medication for several reasons:  To lower blood sugar because you have type 2 diabetes  To reduce your risk of death from heart attack or stroke if you have heart disease and type 2 diabetes  To reduce your risk of death or hospitalization for heart failure  To reduce your risk of further kidney damage, death, or hospitalization if you have chronic kidney disease   What should I expect while on this medication? You should expect to see your blood sugar and A1c decrease over time. You may also see a decrease in your blood pressure and it can help some people lose weight.     How does the medication work? Farxiga works by helping to remove some sugar that the body doesn't need through urination.    How long will I be on this medication for? The  amount of time you will be on this medication will be determined by your doctor based on blood sugar and A1c control. You will most likely be on this medication or another diabetes medication throughout your lifetime. Do not abruptly stop this medication without talking to your doctor first.    How do I take this medication? Take as directed on your prescription label. This medication is usually taken in the morning and can be given with or without food.    What are some possible side effects? You may notice increased urination, especially when you first start Farxiga. Stuffy or runny nose can also occur. The most common side effects are urinary tract infections and yeast infections and are more commonly seen in females. Talk with your doctor if you notice white or yellow vaginal discharge, vaginal itching or odor of if you notice redness, itching, pain, or swelling of the penis and/or bad-smelling discharge from the penis.    What happens if I miss a dose? If you miss a dose, take it as soon as you remember. If it is close to your next dose, skip it (do not take 2 doses at once)     Medication Safety   What are things I should warn my doctor immediately about? Tell your doctor if you have kidney disease, liver disease, heart failure, pancreas problems, or history of frequent genital yeast or urinary tract infections. Tell your doctor if you are on a low-salt diet, if you drink alcohol, or if you are having surgery. Talk to your doctor if you are pregnant, planning to become pregnant, or breastfeeding. Also tell your doctor if you notice any signs/symptoms of an allergic reaction (rash, hives, difficulty breathing, etc.).   What are things that I should be cautious of? Be cautious of any side effects from this medication. Talk to your doctor if any new ones develop or aren't getting better.   What are some medications that can interact with this one? Some medications that interact include diuretics (water pills) and  other medications that may also lower your blood sugar such as insulins and glipizide/glimepiride/glyburide. Your doctor may reduce the dose of these medications when you start Farxiga to minimize low blood sugars. Always tell your doctor or pharmacist immediately if you start taking any new medications, including over-the-counter medications, vitamins, and herbal supplements.      Medication Storage/Handling   How should I handle this medication? Keep this medication out of reach of pets/children in tightly sealed container.   How does this medication need to be stored? Store at room temperature and keep dry (don't keep in bathroom or other room with moisture)   How should I dispose of this medication? There should not be a need to dispose of this medication unless your provider decides to change the dose or therapy. If that is the case, take to your local police station for proper disposal. Some pharmacies also have take-back bins for medication drop-off.      Resources/Support   How can I remind myself to take this medication? You can download reminder apps to help you manage your refills. You may also set an alarm on your phone to remind you. The pharmacy carries pill boxes that you can place next to an area you pass everyday (such as where you place your car keys or where you charge your phone)   Is financial support available?  [employer] can provide co-pay cards if you have commercial insurance or patient assistance if you have Medicare or no insurance.    Which vaccines are recommended for me? Talk to your doctor about these vaccines: Flu, Coronavirus (COVID-19), Pneumococcal (pneumonia), Tdap, Hepatitis B,  Zoster (shingles)         Adherence, Self-Administration, and Current Therapy Problems  Adherence related to the patient's specialty therapy was discussed with the patient. The Adherence segment of this outreach has been reviewed and updated.     Is there a concern with patient's ability to self  administer the medication correctly and without issue?: No  Were any potential barriers to adherence identified during the initial assessment or patient education?: No  Are there any concerns regarding the patient's understanding of the importance of medication adherence?: No    Adherence Questions  Linked Medication(s) Assessed: Dapagliflozin Propanediol (FARXIGA) (hasn't started Farxiga yet)  On average, how many doses/injections does the patient miss per month?: 0  What are the identified reasons for non-adherence or missed doses? : no problems identified  What is the estimated medication adherence level?: %  Based on the patient/caregiver response and refill history, does this patient require an MTP to track adherence improvements?: no    Additional Barriers to Patient Self-Administration: No known barriers at this time   Methods for Supporting Patient Self-Administration: n/a    Open Medication Therapy Problems  No medication therapy recommendations to display    Adverse Drug Reactions  Medication tolerability: Tolerating with no to minimal ADRs (has not started yet - tolerated Trulicity)  Medication plan: Continue therapy with normal follow-up  Plan for ADR Management: N/A at this time.  Pt reports they are tolerating therapy well.     Goals of Therapy  Goals related to the patient's specialty therapy were discussed with the patient. The Patient Goals segment of this outreach has been reviewed and updated.   Goals Addressed Today        Specialty Pharmacy General Goal      Achieve A1c < 7%    Value Date     05/27/2025 Med Addition Assessment - A1c at goal - Patient to start Farxiga (to replace Trulicity) to help maintain glycemic control     03/14/2025 Unable to continue Trulicity due to insurance restrictions    6.90 (H) 03/07/2025 11/25/2024 Reviewed: A1c slightly above goal. On track for now and will continue to monitor.    11/25/2024    7.1 (H) 12/21/2023                Quality of Life Assessment    Quality of Life related to the patient's enrollment in the patient management program and services provided was discussed with the patient. The QOL segment of this outreach has been reviewed and updated.    Quality of Life Improvement Scale: 8-Moderately better    Reassessment Plan & Follow-Up  1. Medication Therapy Changes: START Farxiga 5mg daily in the morning   2. Related Plans, Therapy Recommendations, or Therapy Problems to Be Addressed: if patient does not tolerate Farxiga or takes for >3 months and needs additional therapy, could retry for prior authorization for Mounjaro or GLP-1 RA  3. Pharmacist to perform regular assessments no more than (6) months from the previous assessment.  4. Care Coordinator to set up future refill outreaches, coordinate prescription delivery, and escalate clinical questions to pharmacist.    Attestation  Therapeutic appropriateness: Appropriate   I attest the patient was actively involved in and has agreed to the above plan of care. If the prescribed therapy is at any point deemed not appropriate based on the current or future assessments, a consultation will be initiated with the patient's specialty care provider to determine the best course of action. The revised plan of therapy will be documented along with any required assessments and/or additional patient education provided.     Elaine Florence, PharmD, BCACP, BC-ADM, CDCES  Clinical Specialty Pharmacist, Endocrinology  5/27/2025  15:32 EDT    Discussed the aforementioned information with the patient via Telephone.

## 2025-05-27 NOTE — PROGRESS NOTES
Specialty Pharmacy Patient Management Program  Endocrinology Initial Fill Outreach      Siena is a 45 y.o. male contacted today regarding initial fill of his medication(s).    Specialty medication(s) and dose(s) confirmed: Farxiga 5mg daily (new start)    Delivery Questions      Flowsheet Row Most Recent Value   Delivery method  at Pharmacy   Delivery address verified with patient/caregiver? Yes   Number of medications in delivery 1   Medication(s) being filled and delivered Dapagliflozin Propanediol (FARXIGA)   Copay verified? Yes   Copay amount $10.00   Copay form of payment Pay at pickup   Delivery Date Selection 05/28/25   Signature Required No            Follow-Up: 21 days    Elaine Florence, PharmD, BCACP, BC-ADM, CDCES  Clinical Specialty Pharmacist, Endocrinology  5/27/2025  16:07 EDT

## 2025-06-13 ENCOUNTER — SPECIALTY PHARMACY (OUTPATIENT)
Dept: ENDOCRINOLOGY | Age: 45
End: 2025-06-13
Payer: COMMERCIAL

## 2025-06-13 NOTE — PROGRESS NOTES
Specialty Pharmacy Patient Management Program  Refill Outreach     Siena was contacted today regarding refills of their medication(s).    Refill Questions      Flowsheet Row Most Recent Value   Changes to allergies? No   Changes to medications? Yes  [mefromin d/c'd, changed to dapaglifozin]   New conditions or infections since last clinic visit No   Unplanned office visit, urgent care, ED, or hospital admission in the last 4 weeks  No   How does patient/caregiver feel medication is working? Good   Financial problems or insurance changes  No   Since the previous refill, were any specialty medication doses or scheduled injections missed or delayed?  No   Does this patient require a clinical escalation to a pharmacist? Yes  [mefromin d/c'd, changed to dapaglifozin]            Delivery Questions      Flowsheet Row Most Recent Value   Delivery method  at Pharmacy   Number of medications in delivery 2   Medication(s) being filled and delivered Rosuvastatin Calcium (CRESTOR), Lisinopril (PRINIVIL,ZESTRIL)   Doses left of specialty medications 1 week   Copay verified? Yes   Copay amount $10.00   Copay form of payment Pay at pickup   Signature Required No   Do you consent to receive electronic handouts?  No                 Follow-up: 83 day(s)     Rama Jaeger, Pharmacy Technician  6/13/2025  10:54 EDT

## 2025-06-16 ENCOUNTER — SPECIALTY PHARMACY (OUTPATIENT)
Dept: ENDOCRINOLOGY | Age: 45
End: 2025-06-16
Payer: COMMERCIAL

## 2025-06-16 NOTE — PROGRESS NOTES
Specialty Pharmacy Patient Management Program  One-Time Clinical Outreach     Siena Pop Jr. is a 45 y.o. male seen by an Endocrinology provider for Type 2 Diabetes and enrolled in the Endocrinology Patient Management program offered by [facility].      Called patient to clarify that he should have continued the metformin in addition to starting dapagliflozin. Patient was unaware and said that he will resume metformin. He did not need this medication refilled at this time.   Will plan to fill dapagliflozin on 6/18 when it will process through on the insurance. Patient will be going out of town for 2 weeks and wanted it filled prior.      Specialty Pharmacy Patient Management Program  Endocrinology Refill Outreach      Siena is a 45 y.o. male contacted today regarding refills of his medication(s).    Specialty medication(s) and dose(s) confirmed: dapagliflozin    Refill Questions      Flowsheet Row Most Recent Value   Changes to allergies? No   Changes to medications? No   New conditions or infections since last clinic visit No   Unplanned office visit, urgent care, ED, or hospital admission in the last 4 weeks  No   How does patient/caregiver feel medication is working? Good   Financial problems or insurance changes  No   Since the previous refill, were any specialty medication doses or scheduled injections missed or delayed?  No   Does this patient require a clinical escalation to a pharmacist? No          Delivery Questions      Flowsheet Row Most Recent Value   Delivery method  at Pharmacy   Delivery address Home   Number of medications in delivery 1   Medication(s) being filled and delivered Dapagliflozin Propanediol (FARXIGA)   Copay verified? Yes   Copay amount 10   Copay form of payment Pay at pickup            Follow-Up: 30 days          Clari Monique, PharmD  Clinical Specialty Pharmacist, Endocrinology  6/16/2025  15:10 EDT

## 2025-07-07 ENCOUNTER — SPECIALTY PHARMACY (OUTPATIENT)
Dept: ENDOCRINOLOGY | Age: 45
End: 2025-07-07
Payer: COMMERCIAL

## 2025-07-07 NOTE — PROGRESS NOTES
Specialty Pharmacy Patient Management Program  Refill Outreach     Siena was contacted today regarding refills of their medication(s).    Refill Questions      Flowsheet Row Most Recent Value   Changes to allergies? No   Changes to medications? No   New conditions or infections since last clinic visit No   Unplanned office visit, urgent care, ED, or hospital admission in the last 4 weeks  No   How does patient/caregiver feel medication is working? Good   Financial problems or insurance changes  No   Since the previous refill, were any specialty medication doses or scheduled injections missed or delayed?  No   Does this patient require a clinical escalation to a pharmacist? No            Delivery Questions      Flowsheet Row Most Recent Value   Delivery method  at Pharmacy   Number of medications in delivery 1   Medication(s) being filled and delivered metFORMIN HCl (GLUCOPHAGE-XR)   Doses left of specialty medications 1 week   Copay verified? Yes   Copay amount $10.00   Copay form of payment Pay at pickup   Signature Required No   Do you consent to receive electronic handouts?  No                 Follow-up: 83 day(s)     Rama Jaeger, Pharmacy Technician  7/7/2025  14:46 EDT

## 2025-07-14 DIAGNOSIS — E78.5 HYPERLIPIDEMIA ASSOCIATED WITH TYPE 2 DIABETES MELLITUS: ICD-10-CM

## 2025-07-14 DIAGNOSIS — E55.9 VITAMIN D INSUFFICIENCY: ICD-10-CM

## 2025-07-14 DIAGNOSIS — E11.69 HYPERLIPIDEMIA ASSOCIATED WITH TYPE 2 DIABETES MELLITUS: ICD-10-CM

## 2025-07-14 DIAGNOSIS — E11.65 TYPE 2 DIABETES MELLITUS WITH HYPERGLYCEMIA, WITHOUT LONG-TERM CURRENT USE OF INSULIN: Primary | ICD-10-CM

## 2025-07-17 ENCOUNTER — LAB (OUTPATIENT)
Facility: HOSPITAL | Age: 45
End: 2025-07-17
Payer: COMMERCIAL

## 2025-07-17 LAB
25(OH)D3 SERPL-MCNC: 34.6 NG/ML (ref 30–100)
ALBUMIN SERPL-MCNC: 4.5 G/DL (ref 3.5–5.2)
ALBUMIN UR-MCNC: <1.2 MG/DL
ALBUMIN/GLOB SERPL: 1.7 G/DL
ALP SERPL-CCNC: 73 U/L (ref 39–117)
ALT SERPL W P-5'-P-CCNC: 35 U/L (ref 1–41)
ANION GAP SERPL CALCULATED.3IONS-SCNC: 8.4 MMOL/L (ref 5–15)
AST SERPL-CCNC: 29 U/L (ref 1–40)
BILIRUB SERPL-MCNC: 0.3 MG/DL (ref 0–1.2)
BUN SERPL-MCNC: 17 MG/DL (ref 6–20)
BUN/CREAT SERPL: 17.5 (ref 7–25)
CALCIUM SPEC-SCNC: 9.7 MG/DL (ref 8.6–10.5)
CHLORIDE SERPL-SCNC: 104 MMOL/L (ref 98–107)
CHOLEST SERPL-MCNC: 131 MG/DL (ref 0–200)
CO2 SERPL-SCNC: 24.6 MMOL/L (ref 22–29)
CREAT SERPL-MCNC: 0.97 MG/DL (ref 0.76–1.27)
CREAT UR-MCNC: 24 MG/DL
EGFRCR SERPLBLD CKD-EPI 2021: 98.1 ML/MIN/1.73
GLOBULIN UR ELPH-MCNC: 2.6 GM/DL
GLUCOSE SERPL-MCNC: 131 MG/DL (ref 65–99)
HBA1C MFR BLD: 7.1 % (ref 4.8–5.6)
HDLC SERPL-MCNC: 35 MG/DL (ref 40–60)
LDLC SERPL CALC-MCNC: 78 MG/DL (ref 0–100)
LDLC/HDLC SERPL: 2.19 {RATIO}
MICROALBUMIN/CREAT UR: NORMAL MG/G{CREAT}
POTASSIUM SERPL-SCNC: 4.6 MMOL/L (ref 3.5–5.2)
PROT SERPL-MCNC: 7.1 G/DL (ref 6–8.5)
SODIUM SERPL-SCNC: 137 MMOL/L (ref 136–145)
TRIGL SERPL-MCNC: 96 MG/DL (ref 0–150)
VLDLC SERPL-MCNC: 18 MG/DL (ref 5–40)

## 2025-07-17 PROCEDURE — 80061 LIPID PANEL: CPT | Performed by: STUDENT IN AN ORGANIZED HEALTH CARE EDUCATION/TRAINING PROGRAM

## 2025-07-17 PROCEDURE — 83036 HEMOGLOBIN GLYCOSYLATED A1C: CPT | Performed by: STUDENT IN AN ORGANIZED HEALTH CARE EDUCATION/TRAINING PROGRAM

## 2025-07-17 PROCEDURE — 82570 ASSAY OF URINE CREATININE: CPT | Performed by: STUDENT IN AN ORGANIZED HEALTH CARE EDUCATION/TRAINING PROGRAM

## 2025-07-17 PROCEDURE — 80053 COMPREHEN METABOLIC PANEL: CPT | Performed by: STUDENT IN AN ORGANIZED HEALTH CARE EDUCATION/TRAINING PROGRAM

## 2025-07-17 PROCEDURE — 82043 UR ALBUMIN QUANTITATIVE: CPT | Performed by: STUDENT IN AN ORGANIZED HEALTH CARE EDUCATION/TRAINING PROGRAM

## 2025-07-17 PROCEDURE — 36415 COLL VENOUS BLD VENIPUNCTURE: CPT | Performed by: STUDENT IN AN ORGANIZED HEALTH CARE EDUCATION/TRAINING PROGRAM

## 2025-07-17 PROCEDURE — 82306 VITAMIN D 25 HYDROXY: CPT | Performed by: STUDENT IN AN ORGANIZED HEALTH CARE EDUCATION/TRAINING PROGRAM

## 2025-07-18 ENCOUNTER — SPECIALTY PHARMACY (OUTPATIENT)
Dept: ENDOCRINOLOGY | Age: 45
End: 2025-07-18
Payer: COMMERCIAL

## 2025-07-18 RX ORDER — DAPAGLIFLOZIN 5 MG/1
5 TABLET, FILM COATED ORAL EVERY MORNING
Qty: 30 TABLET | Refills: 0 | Status: SHIPPED | OUTPATIENT
Start: 2025-07-18

## 2025-07-18 NOTE — PROGRESS NOTES
Specialty Pharmacy Patient Management Program  Endocrinology Refill Outreach      Siena is a 45 y.o. male contacted today regarding refills of his medication(s).    Specialty medication(s) and dose(s) confirmed: Farxiga 5mg daily   Non-SpRx Medication(s): Allopurinol      Refill Questions      Flowsheet Row Most Recent Value   Changes to allergies? No   Changes to medications? No   New conditions or infections since last clinic visit No   Unplanned office visit, urgent care, ED, or hospital admission in the last 4 weeks  No   How does patient/caregiver feel medication is working? Good   Financial problems or insurance changes  No   Since the previous refill, were any specialty medication doses or scheduled injections missed or delayed?  No   Does this patient require a clinical escalation to a pharmacist? No          Delivery Questions      Flowsheet Row Most Recent Value   Delivery method  at Pharmacy   Number of medications in delivery 2   Medication(s) being filled and delivered Dapagliflozin Propanediol (FARXIGA), Allopurinol (ZYLOPRIM)   Copay verified? Yes   Copay amount $20.00   Copay form of payment Pay at pickup   Delivery Date Selection 07/21/25   Signature Required No            Follow-Up: 21 days     Elaine Florence, PharmD, BCACP, BC-ADM, [facility]  Clinical Specialty Pharmacist, Endocrinology  7/18/2025  14:59 EDT

## 2025-07-18 NOTE — TELEPHONE ENCOUNTER
Specialty Pharmacy Patient Management Program  Prescription Refill Request     Patient currently fills medications at  Pharmacy. Needing refill(s) on the following:      Requested Prescriptions     Pending Prescriptions Disp Refills    dapagliflozin (Farxiga) 5 MG tablet tablet 30 tablet 0     Sig: Take 1 tablet by mouth Every Morning.       Last visit: 03/14/25  Next visit: 07/25/25    Pended for Dr. Nokhbehzaeim to review, and approve if appropriate.     Elaine Florence, PharmD, BCACP, BC-ADM, [facility]  Clinical Specialty Pharmacist, Endocrinology  7/18/2025  14:58 EDT

## 2025-08-04 DIAGNOSIS — M1A.9XX0 CHRONIC GOUT WITHOUT TOPHUS, UNSPECIFIED CAUSE, UNSPECIFIED SITE: ICD-10-CM

## 2025-08-04 RX ORDER — DAPAGLIFLOZIN 5 MG/1
5 TABLET, FILM COATED ORAL EVERY MORNING
Qty: 30 TABLET | Refills: 0 | OUTPATIENT
Start: 2025-08-04

## 2025-08-05 RX ORDER — ALLOPURINOL 300 MG/1
300 TABLET ORAL DAILY
Qty: 90 TABLET | Refills: 1 | Status: SHIPPED | OUTPATIENT
Start: 2025-08-05

## 2025-08-08 ENCOUNTER — OFFICE VISIT (OUTPATIENT)
Dept: ENDOCRINOLOGY | Age: 45
End: 2025-08-08
Payer: COMMERCIAL

## 2025-08-08 VITALS
BODY MASS INDEX: 32.13 KG/M2 | WEIGHT: 237.2 LBS | DIASTOLIC BLOOD PRESSURE: 70 MMHG | HEIGHT: 72 IN | SYSTOLIC BLOOD PRESSURE: 120 MMHG | OXYGEN SATURATION: 99 % | HEART RATE: 80 BPM

## 2025-08-08 DIAGNOSIS — E55.9 VITAMIN D INSUFFICIENCY: ICD-10-CM

## 2025-08-08 DIAGNOSIS — E78.5 HYPERLIPIDEMIA ASSOCIATED WITH TYPE 2 DIABETES MELLITUS: ICD-10-CM

## 2025-08-08 DIAGNOSIS — E11.65 TYPE 2 DIABETES MELLITUS WITH HYPERGLYCEMIA, WITHOUT LONG-TERM CURRENT USE OF INSULIN: Primary | ICD-10-CM

## 2025-08-08 DIAGNOSIS — E11.69 HYPERLIPIDEMIA ASSOCIATED WITH TYPE 2 DIABETES MELLITUS: ICD-10-CM

## 2025-08-08 PROCEDURE — 99214 OFFICE O/P EST MOD 30 MIN: CPT | Performed by: STUDENT IN AN ORGANIZED HEALTH CARE EDUCATION/TRAINING PROGRAM

## 2025-08-08 RX ORDER — DAPAGLIFLOZIN 10 MG/1
10 TABLET, FILM COATED ORAL DAILY
Qty: 90 TABLET | Refills: 1 | Status: SHIPPED | OUTPATIENT
Start: 2025-08-08

## 2025-08-21 ENCOUNTER — SPECIALTY PHARMACY (OUTPATIENT)
Dept: ENDOCRINOLOGY | Age: 45
End: 2025-08-21
Payer: COMMERCIAL